# Patient Record
Sex: FEMALE | Race: WHITE | NOT HISPANIC OR LATINO | URBAN - METROPOLITAN AREA
[De-identification: names, ages, dates, MRNs, and addresses within clinical notes are randomized per-mention and may not be internally consistent; named-entity substitution may affect disease eponyms.]

---

## 2018-06-20 ENCOUNTER — INPATIENT (INPATIENT)
Facility: HOSPITAL | Age: 73
LOS: 0 days | Discharge: HOME | DRG: 291 | End: 2018-06-21
Attending: INTERNAL MEDICINE | Admitting: INTERNAL MEDICINE
Payer: COMMERCIAL

## 2018-06-20 VITALS
RESPIRATION RATE: 30 BRPM | HEART RATE: 99 BPM | DIASTOLIC BLOOD PRESSURE: 122 MMHG | OXYGEN SATURATION: 93 % | SYSTOLIC BLOOD PRESSURE: 180 MMHG | TEMPERATURE: 98 F

## 2018-06-20 DIAGNOSIS — E03.9 HYPOTHYROIDISM, UNSPECIFIED: ICD-10-CM

## 2018-06-20 DIAGNOSIS — N17.9 ACUTE KIDNEY FAILURE, UNSPECIFIED: ICD-10-CM

## 2018-06-20 DIAGNOSIS — J96.91 RESPIRATORY FAILURE, UNSPECIFIED WITH HYPOXIA: ICD-10-CM

## 2018-06-20 DIAGNOSIS — F32.9 MAJOR DEPRESSIVE DISORDER, SINGLE EPISODE, UNSPECIFIED: ICD-10-CM

## 2018-06-20 DIAGNOSIS — Z95.0 PRESENCE OF CARDIAC PACEMAKER: ICD-10-CM

## 2018-06-20 DIAGNOSIS — I50.20 UNSPECIFIED SYSTOLIC (CONGESTIVE) HEART FAILURE: ICD-10-CM

## 2018-06-20 DIAGNOSIS — J44.9 CHRONIC OBSTRUCTIVE PULMONARY DISEASE, UNSPECIFIED: ICD-10-CM

## 2018-06-20 DIAGNOSIS — I25.10 ATHEROSCLEROTIC HEART DISEASE OF NATIVE CORONARY ARTERY WITHOUT ANGINA PECTORIS: ICD-10-CM

## 2018-06-20 DIAGNOSIS — Z95.0 PRESENCE OF CARDIAC PACEMAKER: Chronic | ICD-10-CM

## 2018-06-20 DIAGNOSIS — I48.91 UNSPECIFIED ATRIAL FIBRILLATION: ICD-10-CM

## 2018-06-20 DIAGNOSIS — Z29.9 ENCOUNTER FOR PROPHYLACTIC MEASURES, UNSPECIFIED: ICD-10-CM

## 2018-06-20 DIAGNOSIS — R63.8 OTHER SYMPTOMS AND SIGNS CONCERNING FOOD AND FLUID INTAKE: ICD-10-CM

## 2018-06-20 DIAGNOSIS — I44.7 LEFT BUNDLE-BRANCH BLOCK, UNSPECIFIED: ICD-10-CM

## 2018-06-20 DIAGNOSIS — R06.02 SHORTNESS OF BREATH: ICD-10-CM

## 2018-06-20 DIAGNOSIS — I10 ESSENTIAL (PRIMARY) HYPERTENSION: ICD-10-CM

## 2018-06-20 LAB
ALBUMIN SERPL ELPH-MCNC: 3.5 G/DL — SIGNIFICANT CHANGE UP (ref 3.4–5)
ALP SERPL-CCNC: 86 U/L — SIGNIFICANT CHANGE UP (ref 40–120)
ALT FLD-CCNC: 35 U/L — SIGNIFICANT CHANGE UP (ref 12–42)
ANION GAP SERPL CALC-SCNC: 16 MMOL/L — SIGNIFICANT CHANGE UP (ref 5–17)
ANION GAP SERPL CALC-SCNC: 18 MMOL/L — HIGH (ref 9–16)
APPEARANCE UR: CLEAR — SIGNIFICANT CHANGE UP
APTT BLD: 73.1 SEC — HIGH (ref 27.5–36.5)
AST SERPL-CCNC: 46 U/L — HIGH (ref 15–37)
BASOPHILS NFR BLD AUTO: 0.9 % — SIGNIFICANT CHANGE UP (ref 0–2)
BILIRUB SERPL-MCNC: 0.2 MG/DL — SIGNIFICANT CHANGE UP (ref 0.2–1.2)
BILIRUB UR-MCNC: NEGATIVE — SIGNIFICANT CHANGE UP
BUN SERPL-MCNC: 23 MG/DL — SIGNIFICANT CHANGE UP (ref 7–23)
BUN SERPL-MCNC: 25 MG/DL — HIGH (ref 7–23)
CALCIUM SERPL-MCNC: 9 MG/DL — SIGNIFICANT CHANGE UP (ref 8.5–10.5)
CALCIUM SERPL-MCNC: 9.2 MG/DL — SIGNIFICANT CHANGE UP (ref 8.4–10.5)
CHLORIDE SERPL-SCNC: 103 MMOL/L — SIGNIFICANT CHANGE UP (ref 96–108)
CHLORIDE SERPL-SCNC: 104 MMOL/L — SIGNIFICANT CHANGE UP (ref 96–108)
CHOLEST SERPL-MCNC: 113 MG/DL — SIGNIFICANT CHANGE UP (ref 10–199)
CK MB BLD-MCNC: 0.94 % — SIGNIFICANT CHANGE UP
CK MB CFR SERPL CALC: 1.1 NG/ML — SIGNIFICANT CHANGE UP (ref 0.5–3.6)
CK MB CFR SERPL CALC: 3.4 NG/ML — SIGNIFICANT CHANGE UP (ref 0–6.7)
CK MB CFR SERPL CALC: 3.5 NG/ML — SIGNIFICANT CHANGE UP (ref 0–6.7)
CK SERPL-CCNC: 104 U/L — SIGNIFICANT CHANGE UP (ref 25–170)
CK SERPL-CCNC: 95 U/L — SIGNIFICANT CHANGE UP (ref 25–170)
CO2 SERPL-SCNC: 18 MMOL/L — LOW (ref 22–31)
CO2 SERPL-SCNC: 20 MMOL/L — LOW (ref 22–31)
COLOR SPEC: YELLOW — SIGNIFICANT CHANGE UP
CREAT ?TM UR-MCNC: 29 MG/DL — SIGNIFICANT CHANGE UP
CREAT SERPL-MCNC: 0.99 MG/DL — SIGNIFICANT CHANGE UP (ref 0.5–1.3)
CREAT SERPL-MCNC: 1.44 MG/DL — HIGH (ref 0.5–1.3)
DIFF PNL FLD: ABNORMAL
EOSINOPHIL NFR BLD AUTO: 4.8 % — SIGNIFICANT CHANGE UP (ref 0–6)
GLUCOSE SERPL-MCNC: 131 MG/DL — HIGH (ref 70–99)
GLUCOSE SERPL-MCNC: 268 MG/DL — HIGH (ref 70–99)
GLUCOSE UR QL: NEGATIVE — SIGNIFICANT CHANGE UP
HBA1C BLD-MCNC: 5.1 % — SIGNIFICANT CHANGE UP (ref 4–5.6)
HCT VFR BLD CALC: 39.2 % — SIGNIFICANT CHANGE UP (ref 34.5–45)
HDLC SERPL-MCNC: 45 MG/DL — SIGNIFICANT CHANGE UP (ref 40–125)
HGB BLD-MCNC: 11.8 G/DL — SIGNIFICANT CHANGE UP (ref 11.5–15.5)
IMM GRANULOCYTES NFR BLD AUTO: 0.3 % — SIGNIFICANT CHANGE UP (ref 0–1.5)
INR BLD: 1.3 — HIGH (ref 0.88–1.16)
KETONES UR-MCNC: NEGATIVE — SIGNIFICANT CHANGE UP
LEUKOCYTE ESTERASE UR-ACNC: NEGATIVE — SIGNIFICANT CHANGE UP
LIPID PNL WITH DIRECT LDL SERPL: 56 MG/DL — SIGNIFICANT CHANGE UP
LYMPHOCYTES # BLD AUTO: 29.7 % — SIGNIFICANT CHANGE UP (ref 13–44)
MCHC RBC-ENTMCNC: 28 PG — SIGNIFICANT CHANGE UP (ref 27–34)
MCHC RBC-ENTMCNC: 30.1 G/DL — LOW (ref 32–36)
MCV RBC AUTO: 92.9 FL — SIGNIFICANT CHANGE UP (ref 80–100)
MONOCYTES NFR BLD AUTO: 4.8 % — SIGNIFICANT CHANGE UP (ref 2–14)
NEUTROPHILS NFR BLD AUTO: 59.5 % — SIGNIFICANT CHANGE UP (ref 43–77)
NITRITE UR-MCNC: NEGATIVE — SIGNIFICANT CHANGE UP
NT-PROBNP SERPL-SCNC: 1893 PG/ML — HIGH
PCO2 BLDV: 64 MMHG — HIGH (ref 41–51)
PCO2 BLDV: 75 MMHG — HIGH (ref 41–51)
PH BLDV: 6.98 — CRITICAL LOW (ref 7.32–7.43)
PH BLDV: 7.1 — CRITICAL LOW (ref 7.32–7.43)
PH UR: 5 — SIGNIFICANT CHANGE UP (ref 5–8)
PLATELET # BLD AUTO: 324 K/UL — SIGNIFICANT CHANGE UP (ref 150–400)
PO2 BLDV: 20 MMHG — LOW (ref 35–40)
PO2 BLDV: 28 MMHG — LOW (ref 35–40)
POTASSIUM SERPL-MCNC: 4 MMOL/L — SIGNIFICANT CHANGE UP (ref 3.5–5.3)
POTASSIUM SERPL-MCNC: 4.4 MMOL/L — SIGNIFICANT CHANGE UP (ref 3.5–5.3)
POTASSIUM SERPL-SCNC: 4 MMOL/L — SIGNIFICANT CHANGE UP (ref 3.5–5.3)
POTASSIUM SERPL-SCNC: 4.4 MMOL/L — SIGNIFICANT CHANGE UP (ref 3.5–5.3)
PROT SERPL-MCNC: 7.3 G/DL — SIGNIFICANT CHANGE UP (ref 6.4–8.2)
PROT UR-MCNC: NEGATIVE MG/DL — SIGNIFICANT CHANGE UP
PROTHROM AB SERPL-ACNC: 14.4 SEC — HIGH (ref 9.8–12.7)
RBC # BLD: 4.22 M/UL — SIGNIFICANT CHANGE UP (ref 3.8–5.2)
RBC # FLD: 13.5 % — SIGNIFICANT CHANGE UP (ref 10.3–16.9)
SAO2 % BLDV: 14 % — SIGNIFICANT CHANGE UP
SAO2 % BLDV: 31 % — SIGNIFICANT CHANGE UP
SODIUM SERPL-SCNC: 139 MMOL/L — SIGNIFICANT CHANGE UP (ref 135–145)
SODIUM SERPL-SCNC: 140 MMOL/L — SIGNIFICANT CHANGE UP (ref 132–145)
SODIUM UR-SCNC: 85 MMOL/L — SIGNIFICANT CHANGE UP
SP GR SPEC: 1.01 — SIGNIFICANT CHANGE UP (ref 1–1.03)
TOTAL CHOLESTEROL/HDL RATIO MEASUREMENT: 2.5 RATIO — LOW (ref 3.3–7.1)
TRIGL SERPL-MCNC: 61 MG/DL — SIGNIFICANT CHANGE UP (ref 10–149)
TROPONIN I SERPL-MCNC: <0.017 NG/ML — LOW (ref 0.02–0.06)
TROPONIN T SERPL-MCNC: 0.03 NG/ML — HIGH (ref 0–0.01)
TROPONIN T SERPL-MCNC: 0.04 NG/ML — HIGH (ref 0–0.01)
TROPONIN T SERPL-MCNC: 0.05 NG/ML — CRITICAL HIGH (ref 0–0.01)
TSH SERPL-MCNC: 3.87 UIU/ML — SIGNIFICANT CHANGE UP (ref 0.35–4.94)
UROBILINOGEN FLD QL: 0.2 E.U./DL — SIGNIFICANT CHANGE UP
UUN UR-MCNC: 174 MG/DL — SIGNIFICANT CHANGE UP
WBC # BLD: 11.6 K/UL — HIGH (ref 3.8–10.5)
WBC # FLD AUTO: 11.6 K/UL — HIGH (ref 3.8–10.5)

## 2018-06-20 PROCEDURE — 93306 TTE W/DOPPLER COMPLETE: CPT | Mod: 26

## 2018-06-20 PROCEDURE — 93280 PM DEVICE PROGR EVAL DUAL: CPT | Mod: 26

## 2018-06-20 PROCEDURE — 99233 SBSQ HOSP IP/OBS HIGH 50: CPT

## 2018-06-20 PROCEDURE — 71045 X-RAY EXAM CHEST 1 VIEW: CPT | Mod: 26

## 2018-06-20 PROCEDURE — 99291 CRITICAL CARE FIRST HOUR: CPT

## 2018-06-20 PROCEDURE — 93010 ELECTROCARDIOGRAM REPORT: CPT

## 2018-06-20 RX ORDER — FUROSEMIDE 40 MG
80 TABLET ORAL ONCE
Qty: 0 | Refills: 0 | Status: COMPLETED | OUTPATIENT
Start: 2018-06-20 | End: 2018-06-20

## 2018-06-20 RX ORDER — ALBUTEROL 90 UG/1
2 AEROSOL, METERED ORAL
Qty: 0 | Refills: 0 | COMMUNITY

## 2018-06-20 RX ORDER — ASPIRIN/CALCIUM CARB/MAGNESIUM 324 MG
325 TABLET ORAL ONCE
Qty: 0 | Refills: 0 | Status: COMPLETED | OUTPATIENT
Start: 2018-06-20 | End: 2018-06-20

## 2018-06-20 RX ORDER — DABIGATRAN ETEXILATE MESYLATE 150 MG/1
150 CAPSULE ORAL EVERY 12 HOURS
Qty: 0 | Refills: 0 | Status: DISCONTINUED | OUTPATIENT
Start: 2018-06-20 | End: 2018-06-20

## 2018-06-20 RX ORDER — ATORVASTATIN CALCIUM 80 MG/1
80 TABLET, FILM COATED ORAL AT BEDTIME
Qty: 0 | Refills: 0 | Status: DISCONTINUED | OUTPATIENT
Start: 2018-06-20 | End: 2018-06-21

## 2018-06-20 RX ORDER — FUROSEMIDE 40 MG
20 TABLET ORAL DAILY
Qty: 0 | Refills: 0 | Status: DISCONTINUED | OUTPATIENT
Start: 2018-06-21 | End: 2018-06-21

## 2018-06-20 RX ORDER — SOTALOL HCL 120 MG
80 TABLET ORAL
Qty: 0 | Refills: 0 | Status: DISCONTINUED | OUTPATIENT
Start: 2018-06-20 | End: 2018-06-20

## 2018-06-20 RX ORDER — DABIGATRAN ETEXILATE MESYLATE 150 MG/1
1 CAPSULE ORAL
Qty: 0 | Refills: 0 | COMMUNITY

## 2018-06-20 RX ORDER — LISINOPRIL 2.5 MG/1
10 TABLET ORAL DAILY
Qty: 0 | Refills: 0 | Status: DISCONTINUED | OUTPATIENT
Start: 2018-06-20 | End: 2018-06-20

## 2018-06-20 RX ORDER — FUROSEMIDE 40 MG
40 TABLET ORAL ONCE
Qty: 0 | Refills: 0 | Status: COMPLETED | OUTPATIENT
Start: 2018-06-20 | End: 2018-06-20

## 2018-06-20 RX ORDER — FLUTICASONE PROPIONATE AND SALMETEROL 50; 250 UG/1; UG/1
1 POWDER ORAL; RESPIRATORY (INHALATION)
Qty: 0 | Refills: 0 | COMMUNITY

## 2018-06-20 RX ORDER — NITROGLYCERIN 6.5 MG
0.4 CAPSULE, EXTENDED RELEASE ORAL ONCE
Qty: 0 | Refills: 0 | Status: COMPLETED | OUTPATIENT
Start: 2018-06-20 | End: 2018-06-20

## 2018-06-20 RX ORDER — IPRATROPIUM/ALBUTEROL SULFATE 18-103MCG
3 AEROSOL WITH ADAPTER (GRAM) INHALATION EVERY 4 HOURS
Qty: 0 | Refills: 0 | Status: DISCONTINUED | OUTPATIENT
Start: 2018-06-20 | End: 2018-06-21

## 2018-06-20 RX ORDER — RAMIPRIL 5 MG
1 CAPSULE ORAL
Qty: 0 | Refills: 0 | COMMUNITY

## 2018-06-20 RX ORDER — METOPROLOL TARTRATE 50 MG
12.5 TABLET ORAL
Qty: 0 | Refills: 0 | Status: DISCONTINUED | OUTPATIENT
Start: 2018-06-20 | End: 2018-06-21

## 2018-06-20 RX ORDER — LEVOTHYROXINE SODIUM 125 MCG
1 TABLET ORAL
Qty: 0 | Refills: 0 | COMMUNITY

## 2018-06-20 RX ORDER — LEVOTHYROXINE SODIUM 125 MCG
75 TABLET ORAL DAILY
Qty: 0 | Refills: 0 | Status: DISCONTINUED | OUTPATIENT
Start: 2018-06-20 | End: 2018-06-21

## 2018-06-20 RX ORDER — DABIGATRAN ETEXILATE MESYLATE 150 MG/1
75 CAPSULE ORAL EVERY 12 HOURS
Qty: 0 | Refills: 0 | Status: DISCONTINUED | OUTPATIENT
Start: 2018-06-20 | End: 2018-06-21

## 2018-06-20 RX ORDER — BUDESONIDE AND FORMOTEROL FUMARATE DIHYDRATE 160; 4.5 UG/1; UG/1
2 AEROSOL RESPIRATORY (INHALATION)
Qty: 0 | Refills: 0 | Status: DISCONTINUED | OUTPATIENT
Start: 2018-06-20 | End: 2018-06-21

## 2018-06-20 RX ORDER — ZOLPIDEM TARTRATE 10 MG/1
1 TABLET ORAL
Qty: 0 | Refills: 0 | COMMUNITY

## 2018-06-20 RX ORDER — HEPARIN SODIUM 5000 [USP'U]/ML
5000 INJECTION INTRAVENOUS; SUBCUTANEOUS EVERY 8 HOURS
Qty: 0 | Refills: 0 | Status: DISCONTINUED | OUTPATIENT
Start: 2018-06-20 | End: 2018-06-20

## 2018-06-20 RX ORDER — ASPIRIN/CALCIUM CARB/MAGNESIUM 324 MG
81 TABLET ORAL DAILY
Qty: 0 | Refills: 0 | Status: DISCONTINUED | OUTPATIENT
Start: 2018-06-20 | End: 2018-06-21

## 2018-06-20 RX ORDER — MONTELUKAST 4 MG/1
1 TABLET, CHEWABLE ORAL
Qty: 0 | Refills: 0 | COMMUNITY

## 2018-06-20 RX ORDER — MONTELUKAST 4 MG/1
10 TABLET, CHEWABLE ORAL DAILY
Qty: 0 | Refills: 0 | Status: DISCONTINUED | OUTPATIENT
Start: 2018-06-20 | End: 2018-06-21

## 2018-06-20 RX ORDER — ASPIRIN/CALCIUM CARB/MAGNESIUM 324 MG
1 TABLET ORAL
Qty: 0 | Refills: 0 | COMMUNITY

## 2018-06-20 RX ORDER — BUPROPION HYDROCHLORIDE 150 MG/1
1 TABLET, EXTENDED RELEASE ORAL
Qty: 0 | Refills: 0 | COMMUNITY

## 2018-06-20 RX ADMIN — ATORVASTATIN CALCIUM 80 MILLIGRAM(S): 80 TABLET, FILM COATED ORAL at 21:51

## 2018-06-20 RX ADMIN — Medication 75 MICROGRAM(S): at 07:09

## 2018-06-20 RX ADMIN — Medication 80 MILLIGRAM(S): at 02:40

## 2018-06-20 RX ADMIN — DABIGATRAN ETEXILATE MESYLATE 75 MILLIGRAM(S): 150 CAPSULE ORAL at 21:51

## 2018-06-20 RX ADMIN — Medication 12.5 MILLIGRAM(S): at 18:10

## 2018-06-20 RX ADMIN — Medication 0.4 MILLIGRAM(S): at 02:40

## 2018-06-20 RX ADMIN — Medication 3 MILLILITER(S): at 06:30

## 2018-06-20 RX ADMIN — BUDESONIDE AND FORMOTEROL FUMARATE DIHYDRATE 2 PUFF(S): 160; 4.5 AEROSOL RESPIRATORY (INHALATION) at 18:11

## 2018-06-20 RX ADMIN — DABIGATRAN ETEXILATE MESYLATE 150 MILLIGRAM(S): 150 CAPSULE ORAL at 07:09

## 2018-06-20 RX ADMIN — Medication 80 MILLIGRAM(S): at 08:20

## 2018-06-20 RX ADMIN — Medication 40 MILLIGRAM(S): at 11:06

## 2018-06-20 RX ADMIN — Medication 325 MILLIGRAM(S): at 03:27

## 2018-06-20 RX ADMIN — Medication 0.4 MILLIGRAM(S): at 02:30

## 2018-06-20 RX ADMIN — Medication 81 MILLIGRAM(S): at 11:06

## 2018-06-20 RX ADMIN — MONTELUKAST 10 MILLIGRAM(S): 4 TABLET, CHEWABLE ORAL at 07:09

## 2018-06-20 NOTE — PROGRESS NOTE ADULT - PROBLEM SELECTOR PLAN 8
History of hypothyroidism.  - Continue with home synthroid 75mcg.   - Check TSH. History of depression. Currently asymptomatic, no SI/HI.  - Continue with home bupropion XL 300mg.

## 2018-06-20 NOTE — H&P ADULT - PROBLEM SELECTOR PROBLEM 2
LBBB (left bundle branch block) KANDY (acute kidney injury) HFrEF (heart failure with reduced ejection fraction)

## 2018-06-20 NOTE — H&P ADULT - PROBLEM SELECTOR PLAN 4
F: no IVF indicated at this time.  E: replete lytes prn.  N: NPO on BIPAP. DASH diet when appropriate. VTE ppx: no indication for pharmacoprophylaxis at this time given low risk for VTE (IMPROVE score of 1).    Code: FULL CODE. CAD s/p PCI x 2  asa 81mg qd  crestor 40mg qhs, Patient states she had a PPM placed back in 2014 after her heart rates went really slow and really fast. PPM interrogated one week prior as per patient.   - Plan as above.

## 2018-06-20 NOTE — H&P ADULT - ASSESSMENT
71 y/o F with PMHx of CHF, PPM, CKD3, HTN, breast CA in remission, compliant with meds, BIBEMS for acute onset SOB from sleep earlier tonight. Admitted from [facility] to cardiac telemetry. 73 y/o F with PMHx of CHF, PPM, CKD3, HTN, breast CA in remission, compliant with meds, BIBEMS for acute onset SOB from sleep earlier tonight. Admitted from [facility] to cardiac telemetry. 71 y/o F with PMHx of HFrEF (last TTE 1 year ago, reportedly EF 25%), atrial fibrillation (on Pradaxa) s/p PPM, CAD s/p PCI x 2 (unk vessels, [facility] in 2010), COPD, HTN, hypothyroidism, depression, breast cancer in remission s/p bilateral mastectomy (1992), BIBEMS for acute onset SOB earlier tonight in the setting of not taking her diuretics for the past two days. Sent from [facility] for management of hypoxic respiratory failure likely 2/2 HF exacerbation vs. r/o ACS vs. COPD.

## 2018-06-20 NOTE — PHYSICAL THERAPY INITIAL EVALUATION ADULT - PERTINENT HX OF CURRENT PROBLEM, REHAB EVAL
72 year old female  BIBEMS for acute onset SOB earlier tonight in the setting of not taking her diuretics for the past two days. States that she was in her usual state of health when she went to bed. Awoke from sleep, walked down the stairs to go get some water, when she tripped and fell near the bottom of the stairs.

## 2018-06-20 NOTE — H&P ADULT - PROBLEM SELECTOR PLAN 6
History of HTN. BP initially 180 systolic on presentation.  - Continue with ramipril 2.5mg qd. Initially labs with BUN/Cr 23/1.44. Unknown baseline. Differential includes KANDY vs. CKD.  - Check urine lytes to elucidate etiology of elevated Cr. Checking FEUrea in the setting of lasix use.  - Obtain collateral from PMD.

## 2018-06-20 NOTE — PROGRESS NOTE ADULT - PROBLEM SELECTOR PLAN 1
Etiology likely secondary to pulm edema in setting to medication noncompliance( Pt not taking her Lasix for 2 days as she was travelling and did not want to pass urine )  Of note pt also had severe hypertension(secondary to pain in setting of fall ), that with med non compliance likel Etiology likely secondary to pulm edema in setting to medication noncompliance( Pt not taking her Lasix for 2 days as she was travelling and did not want to pass urine )  Of note pt also had severe hypertension(secondary to pain in setting of fall )  s/p iv diuresis 80 mg , pt clinically improved , speaking in full sentences now.   will give oral 40 mg lasik, (home dose 20 ) and transition back to 20 upon discharge  Pt now weaned of bipap, now satting well on RA  strict I& O  DAILY WEIGHTS

## 2018-06-20 NOTE — ED PROVIDER NOTE - MEDICAL DECISION MAKING DETAILS
CHF exacerbation with significant hypoxia and respiratory distress, improving with bipap lasix and SLNTG, cxr c/w CHF, LBBB on EKG, no signs of acute ischemia, awaiting labs, will need admission for continued NIPPV, diuretic therapy, TTE, serial cardiac enzymes, further workup and treatment as indicated

## 2018-06-20 NOTE — H&P ADULT - PROBLEM SELECTOR PLAN 5
VTE ppx: no indication for pharmacoprophylaxis at this time given low risk for VTE (IMPROVE score of 1).    Code: FULL CODE. Initially labs with BUN/Cr 23/1.44. Unknown baseline. Differential includes KANDY vs. CKD.  - Check urine lytes to elucidate etiology of elevated Cr. Checking FEUrea in the setting of lasix use.  - Obtain collateral from PMD. History of CAD s/p PCI x 2, unknown vessels. Presented with acute onset SOB. Trop neg x 1 at [facility]. On asa 81mg qd and crestor 40mg qhs at home. ASA loaded at [facility].  - Continue on ASA 81mg qd and atorvastatin 80mg qhs as per therapeutic interchange.  - TTE in AM.  - EKG in AM. History of CAD s/p PCI x 2, unknown vessels. Presented with acute onset SOB. Trop neg x 1 at [facility]. On asa 81mg qd and crestor 40mg qhs at home. ASA loaded at [facility].  - Continue on ASA 81mg qd and atorvastatin 80mg qhs as per therapeutic interchange.  - TTE in AM.  - EKG in AM.  - Obtain collateral from PMD. History of CAD s/p PCI x 2, unknown vessels. Placed at [facility] in 2010. Presented with acute onset SOB. Trop neg x 1 at [facility]. On asa 81mg qd and crestor 40mg qhs at home. ASA loaded at [facility].  - Continue on ASA 81mg qd and atorvastatin 80mg qhs as per therapeutic interchange.  - TTE in AM.  - EKG in AM.  - Obtain collateral from PMD.

## 2018-06-20 NOTE — PROGRESS NOTE ADULT - ATTENDING COMMENTS
Pt is a 73 yo F with h/o CAD s/p SHELLIE to RCA, mLAD in 2010, PAF c/b tachy-leila syndrome s/p dual chamber ppm, CM p/w ADHF in setting of medication non-compliance of diuretic and hypertensive emergency from mechanical fall. VS reviewed and labs notable for mildly elevated troponin. TTE notable for LVEF 20-25% w/ no hemodynamically significant valve disease.   - Collateral information obtained and it appears in 2016 her EF was 50-55%, with steadily declining EF most recently 6/2/18 of 35-40% with her cardiologist in [place]. It should be noted that her EF at one point in time was as low as 10-15% per report with full recovery. With that, she has e/o a LBBB which her cardiologist in [place] reports does not appear new.   Plan:  - In light of dropping EF, she requires further ischemic eval and would like to proceed with [facility] at this time. Pt hesitant to perform this today at [facility] and for this will discuss with her the need. In light of EF <35%, will proceed with initiation of lopressor 12.5 mg twice daily tonight. Cont to hold acei while awaiting morning BMP. Can resume Lasix 20 mg po daily for tomorrow.   - Have asked EP to weigh in on the use of sotalol at this time given her severe systolic dysfunction.

## 2018-06-20 NOTE — PROGRESS NOTE ADULT - PROBLEM SELECTOR PLAN 10
F: no IVF indicated at this time.  E: replete lytes prn.  N: NPO on BIPAP. DASH diet when appropriate.    Ppx: on Pradaxa.  Code: FULL CODE.

## 2018-06-20 NOTE — PROGRESS NOTE ADULT - PROBLEM SELECTOR PLAN 5
History of CAD s/p PCI x 2, unknown vessels. Placed at [facility] in 2010. Presented with acute onset SOB. Trop neg x 1 at [facility]. On asa 81mg qd and crestor 40mg qhs at home. ASA loaded at [facility].  - Continue on ASA 81mg qd and atorvastatin 80mg qhs as per therapeutic interchange.  - TTE in AM.  - EKG in AM.  - Obtain collateral from PMD. Initially labs with BUN/Cr 23/1.44. Unknown baseline. Differential includes KANDY vs. CKD.  - Check urine lytes to elucidate etiology of elevated Cr. Checking FEUrea in the setting of lasix use.  - Obtain collateral from PMD.

## 2018-06-20 NOTE — PROGRESS NOTE ADULT - PROBLEM SELECTOR PLAN 7
History of COPD. Not currently in exacerbation, but initially with mild end expiratory wheezes secondary to medication non-compliance.  - Duonebs q4h prn for wheezing/SOB.  - Continue with home doses of montelukast 10mg qd and advair 250/50.  - Supplemental O2 as needed. History of hypothyroidism.  - Continue with home synthroid 75mcg.   - Check TSH.

## 2018-06-20 NOTE — ED ADULT NURSE NOTE - OBJECTIVE STATEMENT
72y female, BIBA for sudden onset SOB at home. Pt came in via EMS with CPAP with O2 sat at the 90s. Pt also noted hypertensive, 180/122 BP; switched to BiPAP 15/10 settings. Pt placed on cardiac monitor. Given 2 Nitro Sublingual, 5mins in between. with improvement of sx. Lasix given 80mg IVP.Victor cath inserted with initial output of 650ml.

## 2018-06-20 NOTE — H&P ADULT - NSHPOUTPATIENTPROVIDERS_GEN_ALL_CORE
PMD Dr. Sterling ([place])  Dr. Adi Yost (cardiologist in [facility]) PMD Dr. Nuha Sterling (PMD in [facility]; [phone])  Dr. Adi Yost (cardiologist in [facility] - maybe [phone] / [phone])

## 2018-06-20 NOTE — H&P ADULT - PROBLEM SELECTOR PLAN 3
Initially labs with BUN/Cr 23/1.44. F: no IVF indicated at this time.  E: replete lytes prn.  N: NPO on BIPAP. DASH diet when appropriate. Atrial fibrillation  pradaxa 150mg bid  betapace 80mg bid History of possible atrial fibrillation. Patient states she had a PPM placed back in 2014 after her heart rates went really slow and really fast. PPM interrogated one week prior as per patient. Has been on pradaxa 150mg bid and sotalol 80mg bid ever since.  - Continue on pradaxa 150mg bid and sotalol 80mg bid. History of possible atrial fibrillation. Patient states she had a PPM placed back in 2014 after her heart rates went really slow and really fast. PPM interrogated one week prior as per patient. Has been on pradaxa 150mg bid and sotalol 80mg bid ever since.  - Continue on pradaxa 150mg bid and sotalol 80mg bid.  - Consult EP for PPM interrogation.  - Obtain collateral from PMD.

## 2018-06-20 NOTE — PROGRESS NOTE ADULT - PROBLEM SELECTOR PLAN 3
History of possible atrial fibrillation. Patient states she had a PPM placed back in 2014 after her heart rates went really slow and really fast. PPM interrogated one week prior as per patient. Has been on pradaxa 150mg bid and sotalol 80mg bid ever since.  - Continue on pradaxa 150mg bid and sotalol 80mg bid.  - Consult EP for PPM interrogation.  - Obtain collateral from PMD. As per collateral Pt was first seen 3 years ago by Dr Yost, she was found to be bradycardic, holter showed a fib with long pauses, pt was put on dual pacemaker since.   Device was interrogated a week ago in [place], as per PMD   Has been on pradaxa 150mg bid and sotalol 80mg bid ever since  .- Continue on pradaxa 150mg bid and sotalol 80mg bid.  EP saw pt and interrogated device, normal device check.  She should follow up with her primary EP doctor as scheduled.  No parameter changes.  paroxysmal afib with a low burden <1% on pradaxa- there were no arrhythmias that correlate with her hospital stay / SOB

## 2018-06-20 NOTE — PROGRESS NOTE ADULT - PROBLEM SELECTOR PLAN 4
Patient states she had a PPM placed back in 2014 after her heart rates went really slow and really fast. PPM interrogated one week prior as per patient.   - Plan as above. History of CAD s/p PCI x 2, unknown vessels done in [facility] in 2010. Presented with acute onset SOB. Trop neg x 1 at [facility]. On asa 81mg qd and crestor 40mg qhs at home. ASA loaded at [facility].  - Continue on ASA 81mg qd and atorvastatin 80mg qhs as per therapeutic interchange.

## 2018-06-20 NOTE — H&P ADULT - PROBLEM SELECTOR PLAN 1
Presented with acute onset SOB tonight. Differential includes HF exacerbation vs.  - Trend cardiac enzymes.  - Continue with BIPAP.  - Lasix IV prn.   - Strict I&Os.  - Daily weights. Presented with acute onset SOB. CXR with pulmonary vascular congestion pattern. BNP 1893. GIven history of HFrEF, HF exacerbation is the most likely cause. Ruling out ACS given history of CAD s/p PCI x 2. Also suspect component of COPD in initial hypoxic respiratory failure. Less likely PE given Wells Score of 1.5.   - Trend cardiac enzymes.  - Wean off NC O2 as tolerated. BIPAP at bedside.  - Can give lasix IV prn, titrate to UOP. Home dose is lasix 20mg qd.   - Continue ramipril 2.5mg qd home dose.  - Strict I&Os.  - Daily weights. Presented with acute onset SOB. CXR with pulmonary vascular congestion pattern. BNP 1893. GIven history of HFrEF, HF exacerbation is the most likely cause. Ruling out ACS given history of CAD s/p PCI x 2. Also suspect component of COPD in initial hypoxic respiratory failure. Less likely PE given Wells Score of 1.5.   - Trend cardiac enzymes.  - Wean off NC O2 as tolerated. BIPAP at bedside.  - Can give lasix IV prn, titrate to UOP. Home dose is lasix 20mg qd.   - Continue ramipril 2.5mg qd home dose.  - TTE in AM.  - EKG in AM.  - Strict I&Os.  - Daily weights. Presented with acute onset SOB. CXR with pulmonary vascular congestion pattern. BNP 1893. GIven history of HFrEF, HF exacerbation is the most likely cause. Ruling out ACS given history of CAD s/p PCI x 2. Also suspect component of COPD in initial hypoxic respiratory failure. Less likely PE given Wells Score of 1.5.   - Trend cardiac enzymes.  - Wean off NC O2 as tolerated. BIPAP at bedside.  - Can give lasix IV prn, titrate to UOP. Home dose is lasix 20mg qd.   - Continue ramipril 2.5mg qd home dose.  - TTE in AM.  - EKG in AM.  - Strict I&Os.  - Daily weights.  - Obtain collateral from PMD. Presented with acute onset SOB. CXR with pulmonary vascular congestion pattern. BNP 1893. VBG concerning 6.98, CO2 75, O2 20. Given history of HFrEF, HF exacerbation is the most likely cause. Ruling out ACS given history of CAD s/p PCI x 2 and EKG with LBBB (unknown if new). Also suspect component of COPD in initial hypoxic respiratory failure. Less likely PE given Wells Score of 1.5.   - Trend cardiac enzymes.  - Wean off NC O2 as tolerated. BIPAP at bedside.  - Can give lasix IV prn, titrate to UOP. Home dose is lasix 20mg qd.   - Continue ramipril 2.5mg qd home dose.  - TTE in AM.  - EKG in AM.  - Strict I&Os.  - Daily weights.  - Obtain collateral from PMD.

## 2018-06-20 NOTE — PROGRESS NOTE ADULT - SUBJECTIVE AND OBJECTIVE BOX
Electrophysiology Device Interrogation       Indication: CHF    Device model: Tax [employer]i Adaptaplaced  3/2015				                                 Functioning Mode: AAIR<-->DDDR 60/120		    Underlying Rhythm:  NSR     Pacemaker dependency:  NO AP 92%,  <0.1%    Battery status: 90years    Interrogating parameters:   				RA			RV			   Sense:                                      4-5.6                             8-11.2  Threshold:                              0.75V@0.4ms                    0.5V@0.4ms                                                                   Pacing Impedance:                 395                                 630                                                                                                                                                                           Events/Alert:  one episode of afib 4/2018 - she is on pradaxa    Parameter change: 	     Assessment: normal device check.  She should follow up with her primary EP doctor as scheduled.  No parameter changes.  paroxysmal afib with a low burden <1% on pradaxa- there were no arrhythmias that correlate with her hospital stay / SOB Electrophysiology Device Interrogation       Indication: CHF    Device model: [employer] Adaptaplaced  3/2015				                                 Functioning Mode: AAIR<-->DDDR 60/120		    Underlying Rhythm:  NSR     Pacemaker dependency:  NO AP 92%,  <0.1%    Battery status: 90years    Interrogating parameters:   				RA			RV			   Sense:                                      4-5.6                             8-11.2  Threshold:                              0.75V@0.4ms                    0.5V@0.4ms                                                                   Pacing Impedance:                 395                                 630                                                                                                                                                                           Events/Alert:  one episode of afib 4/2018 - she is on pradaxa    Parameter change: 	     Assessment: normal device check.  She should follow up with her primary EP doctor as scheduled.  No parameter changes.  paroxysmal afib with a low burden <1% on pradaxa- there were no arrhythmias that correlate with her hospital stay / SOB  patient is on Sotalol and has regular cardiology / EP follow up - this is suppressing her atrial fibrillation (<0.5% burden).  Any changes in this should be made by her primary doctor.  If she needs to be on a beta blocker from a general cardiology perspective for a decreased EF there is no electrophysiology contraindication to starting Metoprolol while on Sotalol. Electrophysiology Device Interrogation       Indication: CHF    Device model: [employer] Adaptaplaced  3/2015				                                 Functioning Mode: AAIR<-->DDDR 60/120		    Underlying Rhythm:  NSR     Pacemaker dependency:  NO AP 92%,  <0.1%    Battery status: 90years    Interrogating parameters:   				RA			RV			   Sense:                                      4-5.6                             8-11.2  Threshold:                              0.75V@0.4ms                    0.5V@0.4ms                                                                   Pacing Impedance:                 395                                 630                                                                                                                                                                           Events/Alert:  one episode of afib 4/2018 - she is on pradaxa    Parameter change: 	     Assessment: normal device check.  She should follow up with her primary EP doctor as scheduled.  No parameter changes.  paroxysmal afib with a low burden <1% on pradaxa- there were no arrhythmias that correlate with her hospital stay / SOB  patient is on Sotalol and has regular cardiology / EP follow up -sotalol appears to be successful at suppressing her atrial fibrillation (<0.5% burden).  Any changes in this should be made by her primary doctor. There are no EP contraindications to the addition of metoprolol for treatment of a depressed EF as per the general published guidelines for treatment of a reduced ejection fraction.

## 2018-06-20 NOTE — H&P ADULT - NSHPLABSRESULTS_GEN_ALL_CORE
.  LABS:                         11.8   11.6  )-----------( 324      ( 20 Jun 2018 02:50 )             39.2     06-20    140  |  104  |  23  ----------------------------<  268<H>  4.4   |  18<L>  |  1.44<H>    Ca    9.0      20 Jun 2018 02:50    TPro  7.3  /  Alb  3.5  /  TBili  0.2  /  DBili  x   /  AST  46<H>  /  ALT  35  /  AlkPhos  86  06-20    PT/INR - ( 20 Jun 2018 02:50 )   PT: 14.4 sec;   INR: 1.30          PTT - ( 20 Jun 2018 02:50 )  PTT:73.1 sec    CARDIAC MARKERS ( 20 Jun 2018 02:50 )  <0.017 ng/mL / x     / 117 U/L / x     / 1.1 ng/mL      Serum Pro-Brain Natriuretic Peptide: 1893 pg/mL (06-20 @ 02:50)        RADIOLOGY, EKG & ADDITIONAL TESTS: Reviewed. .  LABS:                         11.8   11.6  )-----------( 324      ( 20 Jun 2018 02:50 )             39.2     06-20    140  |  104  |  23  ----------------------------<  268<H>  4.4   |  18<L>  |  1.44<H>    Ca    9.0      20 Jun 2018 02:50    TPro  7.3  /  Alb  3.5  /  TBili  0.2  /  DBili  x   /  AST  46<H>  /  ALT  35  /  AlkPhos  86  06-20    PT/INR - ( 20 Jun 2018 02:50 )   PT: 14.4 sec;   INR: 1.30     PTT - ( 20 Jun 2018 02:50 )  PTT:73.1 sec    CARDIAC MARKERS ( 20 Jun 2018 02:50 )  <0.017 ng/mL / x     / 117 U/L / x     / 1.1 ng/mL    Serum Pro-Brain Natriuretic Peptide: 1893 pg/mL (06-20 @ 02:50)    RADIOLOGY, EKG & ADDITIONAL TESTS: Reviewed.

## 2018-06-20 NOTE — H&P ADULT - PROBLEM SELECTOR PROBLEM 5
Need for prophylactic measure HTN (hypertension) KANDY (acute kidney injury) CAD (coronary artery disease)

## 2018-06-20 NOTE — ED PROVIDER NOTE - DIAGNOSTIC INTERPRETATION
cxr: Bipap tubing over mediastinum, Cardiac silhouette, and hilar contours wnl, no acute consolidation, +pulmonary edema. No bony abnormalities noted

## 2018-06-20 NOTE — H&P ADULT - HISTORY OF PRESENT ILLNESS
73 y/o F with PMHx of CHF, PPM, CKD3, HTN, breast CA in remission, compliant with meds, BIBEMS for acute onset SOB from sleep earlier tonight.    Patient called EMS and was found to have labored breathing, hypertensive and satting 70% upon arrival. Placed on CPAP with improvement to 90s. At [facility] ED, VS T 98, HR 99, /122, RR 30, O2 93 on CPAP. Patient was unable to speak more than 1 word, converted to bipap 15/10 with improvement in sats to 100. Labs notable for WBC 11.6, AG 18, BUN/Cr 23/1.44, glucose 268, coags elevated (PTT 73.1, INR 1.30), trop neg x 1, BNP 1893. CXR with pulmonary vascular congestion. EKG with LBBB. Given lasix 80mg IVP x 1, NTG SL x 2, ASA 325mg. Continued on BIPAP. 73 y/o F with PMHx of HFrEF (last TTE over 1 year ago reportedly EF 25%), ?tachy/leila s/p PPM, HTN, breast cancer in remission s/p bilateral mastectomy (1992), BIBEMS for acute onset SOB earlier tonight in the setting of not taking her diuretics for the past two days. States that she was in her usual state of health when she went to bed. Awoke from sleep, walked down the stairs to go get some water, when she tripped and fell near the bottom of the stairs. Denies head trauma or LOC. She then felt very anxious and acutely SOB. She went back upstairs and the SOB acutely worsened, feeling like she could not take a deep breath. Denies CP, palpitations, cough, orthopnea, PND, decreased exercise tolerance, URI symptoms, fever, chills, N/V/D, abd pain, LE edema. Has been trying to live a healthy lifestyle -- eating healthy and exercising every day.     Patient called EMS and was found to have labored breathing, hypertensive and satting 70% upon arrival. Placed on CPAP with improvement to 90s. At [facility] ED, VS T 98, HR 99, /122, RR 30, O2 93 on CPAP. Patient was unable to speak more than 1 word, converted to bipap 15/10 with improvement in sats to 100. Labs notable for WBC 11.6, AG 18, BUN/Cr 23/1.44, glucose 268, coags elevated (PTT 73.1, INR 1.30), trop neg x 1, BNP 1893. CXR with pulmonary vascular congestion. EKG with LBBB. Given lasix 80mg IVP x 1, NTG SL x 2, ASA 325mg. Continued on BIPAP. 73 y/o F with PMHx of HFrEF (last TTE over 1 year ago reportedly EF 25%), ?tachy/leila s/p PPM, COPD, HTN, hypothyroidism, depression, breast cancer in remission s/p bilateral mastectomy (), BIBEMS for acute onset SOB earlier tonight in the setting of not taking her diuretics for the past two days. States that she was in her usual state of health when she went to bed. Awoke from sleep, walked down the stairs to go get some water, when she tripped and fell near the bottom of the stairs. Denies head trauma or LOC. She then felt very anxious and acutely SOB. She went back upstairs and the SOB acutely worsened, feeling like she could not take a deep breath. Denies CP, palpitations, cough, orthopnea, PND, decreased exercise tolerance, URI symptoms, fever, chills, N/V/D, abd pain, LE edema. Has been trying to live a healthy lifestyle -- eating healthy and exercising every day.     Patient called EMS and was found to have labored breathing, hypertensive and satting 70% upon arrival. Placed on CPAP with improvement to 90s. At [facility] ED, VS T 98, HR 99, /122, RR 30, O2 93 on CPAP. Patient was unable to speak more than 1 word, converted to bipap 15/10 with improvement in sats to 100. Labs notable for WBC 11.6, AG 18, BUN/Cr 23/1.44, glucose 268, coags elevated (PTT 73.1, INR 1.30), trop neg x 1, BNP 1893. CXR with pulmonary vascular congestion. EKG with LBBB. Given lasix 80mg IVP x 1, NTG SL x 2, ASA 325mg. Continued on BIPAP.     PMHx: as above.  Surg hx: as above.  Meds: ramapril 2.5mg qd, lasix 20mg qd, montelukast 10mg qd, synthroid 75mcg qd, bupropion 300mg XL qd, asa 81mg qd, pradaxa 150mg bid, betapace 80mg bid, crestor 40mg qhs, ambien 20mg qhs, advair 250/50, ventolin hfa  All: NKA  Soc hx: former smoker quit 5 years ago, smoked for 25 y x 1.5ppd, former etoh, denies drugs  Fhx: mother and father with CAD,  before age of [age] y/o F with PMHx of HFrEF (last TTE 1 year ago, reportedly EF 25%), ?atrial fibrillation (on Pradaxa) s/p PPM, COPD, HTN, hypothyroidism, depression, breast cancer in remission s/p bilateral mastectomy (), BIBEMS for acute onset SOB earlier tonight in the setting of not taking her diuretics for the past two days. States that she was in her usual state of health when she went to bed. Awoke from sleep, walked down the stairs to go get some water, when she tripped and fell near the bottom of the stairs. Denies head trauma or LOC. She then felt very anxious and acutely SOB. She went back upstairs and the SOB acutely worsened, feeling like she could not take a deep breath. Denies CP, palpitations, cough, orthopnea, PND, decreased exercise tolerance, URI symptoms, fever, chills, N/V/D, abd pain, LE edema. Has been trying to live a healthy lifestyle -- eating healthy and exercising every day.     Patient called EMS and was found to have labored breathing, hypertensive and satting 70% upon arrival. Placed on CPAP with improvement to 90s. At [facility] ED, VS T 98, HR 99, /122, RR 30, O2 93 on CPAP. Patient was unable to speak more than 1 word, converted to bipap 15/10 with improvement in sats to 100. Labs notable for WBC 11.6, AG 18, BUN/Cr 23/1.44, glucose 268, coags elevated (PTT 73.1, INR 1.30), trop neg x 1, BNP 1893. CXR with pulmonary vascular congestion. EKG with LBBB. Given lasix 80mg IVP x 1, NTG SL x 2, ASA 325mg. Continued on BIPAP.     Upon arrival to [facility] [address], patient with no complaints. BIPAP was removed and patient tolerated 4L NC, satting 97%, respirations non-labored. UOP -600cc post-Lasix.    PMHx: as above.  Surg hx: as above.  Meds: ramapril 2.5mg qd, lasix 20mg qd, montelukast 10mg qd, synthroid 75mcg qd, bupropion 300mg XL qd, asa 81mg qd, pradaxa 150mg bid, betapace 80mg bid, crestor 40mg qhs, ambien 20mg qhs, advair 250/50, ventolin hfa  All: NKA  Soc hx: former smoker quit 5 years ago, smoked for 25 y x 1.5ppd, former etoh, denies drugs  Fhx: mother and father with CAD,  before age of [age] y/o F with PMHx of HFrEF (last TTE 1 year ago, reportedly EF 25%), ?atrial fibrillation (on Pradaxa) s/p PPM, COPD, HTN, hypothyroidism, depression, breast cancer in remission s/p bilateral mastectomy (), BIBEMS for acute onset SOB earlier tonight in the setting of not taking her diuretics for the past two days. States that she was in her usual state of health when she went to bed. Awoke from sleep, walked down the stairs to go get some water, when she tripped and fell near the bottom of the stairs. Denies head trauma or LOC. She then felt very anxious and acutely SOB. She went back upstairs and the SOB acutely worsened, feeling like she could not take a deep breath. Denies CP, palpitations, cough, orthopnea, PND, decreased exercise tolerance, URI symptoms, fever, chills, N/V/D, abd pain, LE edema. Has been trying to live a healthy lifestyle -- eating healthy and exercising every day.     Patient called EMS and was found to have labored breathing, hypertensive and satting 70% upon arrival. Placed on CPAP with improvement to 90s. At [facility] ED, VS T 98, HR 99, /122, RR 30, O2 93 on CPAP. Patient was unable to speak more than 1 word, converted to bipap 15/10 with improvement in sats to 100. Labs notable for WBC 11.6, AG 18, BUN/Cr 23/1.44, glucose 268, coags elevated (PTT 73.1, INR 1.30), trop neg x 1, BNP 1893. CXR with pulmonary vascular congestion. EKG with LBBB. Given lasix 80mg IVP x 1, NTG SL x 2, ASA 325mg. Continued on BIPAP.     Upon arrival to [facility] [address], BIPAP was removed and VS HR 85, /70, RR 18, O2 99 on 4L. Patient with no complaints, respirations non-labored. UOP -600cc post-Lasix.    PMHx: as above.  Surg hx: as above.  Meds: ramapril 2.5mg qd, lasix 20mg qd, montelukast 10mg qd, synthroid 75mcg qd, bupropion 300mg XL qd, asa 81mg qd, pradaxa 150mg bid, betapace 80mg bid, crestor 40mg qhs, ambien 20mg qhs, advair 250/50, ventolin hfa  All: NKA  Soc hx: former smoker quit 5 years ago, smoked for 25 y x 1.5ppd, former etoh, denies drugs  Fhx: mother and father with CAD,  before age of [age] y/o F with PMHx of HFrEF (last TTE 1 year ago, reportedly EF 25%), ?atrial fibrillation (on Pradaxa) s/p PPM, COPD, HTN, hypothyroidism, depression, breast cancer in remission s/p bilateral mastectomy (), BIBEMS for acute onset SOB earlier tonight in the setting of not taking her diuretics for the past two days. States that she was in her usual state of health when she went to bed. Awoke from sleep, walked down the stairs to go get some water, when she tripped and fell near the bottom of the stairs. Denies head trauma or LOC. She then felt very anxious and acutely SOB. She went back upstairs and the SOB acutely worsened, feeling like she could not take a deep breath. Denies CP, palpitations, cough, orthopnea, PND, decreased exercise tolerance, URI symptoms, fever, chills, N/V/D, abd pain, LE edema. Has been trying to live a healthy lifestyle -- eating healthy and exercising every day.     Patient called EMS and was found to have labored breathing, hypertensive and satting 70% upon arrival. Placed on CPAP with improvement to 90s. At [facility] ED, VS T 98, HR 99, /122, RR 30, O2 93 on CPAP. Patient was unable to speak more than 1 word, converted to bipap 15/10 with improvement in sats to 100. Labs notable for WBC 11.6, AG 18, BUN/Cr 23/1.44, glucose 268, coags elevated (PTT 73.1, INR 1.30), trop neg x 1, BNP 1893. CXR with pulmonary vascular congestion. EKG NSR 94 with LBBB. Given lasix 80mg IVP x 1, NTG SL x 2, ASA 325mg. Continued on BIPAP.     Upon arrival to [facility] [address], BIPAP was removed and VS HR 85, /70, RR 18, O2 99 on 4L. Patient with no complaints, respirations non-labored. UOP -600cc post-Lasix.    PMHx: as above.  Surg hx: as above.  Meds: ramapril 2.5mg qd, lasix 20mg qd, montelukast 10mg qd, synthroid 75mcg qd, bupropion 300mg XL qd, asa 81mg qd, pradaxa 150mg bid, betapace 80mg bid, crestor 40mg qhs, ambien 20mg qhs, advair 250/50, ventolin hfa  All: NKA  Soc hx: former smoker quit 5 years ago, smoked for 25 y x 1.5ppd, former etoh, denies drugs  Fhx: mother and father with CAD,  before age of [age] y/o F with PMHx of HFrEF (last TTE 1 year ago, reportedly EF 25%), atrial fibrillation (on Pradaxa) s/p PPM, CAD s/p PCI x 2 (unk vessels, NYP in ), COPD, HTN, hypothyroidism, depression, breast cancer in remission s/p bilateral mastectomy (), BIBEMS for acute onset SOB earlier tonight in the setting of not taking her diuretics for the past two days. States that she was in her usual state of health when she went to bed. Awoke from sleep, walked down the stairs to go get some water, when she tripped and fell near the bottom of the stairs. Denies head trauma or LOC. She then felt very anxious and acutely SOB. She went back upstairs and the SOB acutely worsened, feeling like she could not take a deep breath. Denies CP, palpitations, cough, orthopnea, PND, decreased exercise tolerance, URI symptoms, fever, chills, N/V/D, abd pain, LE edema. Has been trying to live a healthy lifestyle -- eating healthy and exercising every day.     Patient called EMS and was found to have labored breathing, hypertensive and satting 70% upon arrival. Placed on CPAP with improvement to 90s. At [facility] ED, VS T 98, HR 99, /122, RR 30, O2 93 on CPAP. Patient was unable to speak more than 1 word, converted to bipap 15/10 with improvement in sats to 100. Labs notable for WBC 11.6, AG 18, BUN/Cr 23/1.44, glucose 268, coags elevated (PTT 73.1, INR 1.30), trop neg x 1, BNP 1893. CXR with pulmonary vascular congestion. EKG NSR 94 with LBBB. Given lasix 80mg IVP x 1, NTG SL x 2, ASA 325mg. Continued on BIPAP.     Upon arrival to [facility] [address], BIPAP was removed and VS HR 85, /70, RR 18, O2 99 on 4L. Patient with no complaints, respirations non-labored. UOP -600cc post-Lasix.    PMHx: as above.  Surg hx: as above.  Meds: ramapril 2.5mg qd, lasix 20mg qd, montelukast 10mg qd, synthroid 75mcg qd, bupropion 300mg XL qd, asa 81mg qd, pradaxa 150mg bid, betapace 80mg bid, crestor 40mg qhs, ambien 20mg qhs, advair 250/50, ventolin hfa  All: NKA  Soc hx: former smoker quit 5 years ago, smoked for 25 y x 1.5ppd, former etoh, denies drugs  Fhx: mother and father with CAD,  before age of 69

## 2018-06-20 NOTE — ED PROVIDER NOTE - PROGRESS NOTE DETAILS
pt improving, BP 130s/90s, unlabored ph improving pt clinically improving discussed with Dr Villegas accepted to step down for further care

## 2018-06-20 NOTE — ED PROVIDER NOTE - OBJECTIVE STATEMENT
h/o CHF, PPM, HTN, breast CA in remission, compliant with meds, pt woke up with sudden onset shortness of breath tonight, called EMS, per EMS pt sattign 70% upon arrival, labored, hypertensive, placed on CPAP with improvement to 90s, brought to ED, here pt with increased WOB, unable to speak more than 1 word, hypertensive, denies f/c/cp/abd pain/leg swelling, converted to bipap 15/10 with improvement in sats to 100, 2 SLNTG given over 5 minutes with improvement in BP and symptoms, lasix given for presumed CHF exacerbation

## 2018-06-20 NOTE — ED PROVIDER NOTE - PHYSICAL EXAMINATION
respiratory distress  NCAT  PERRL, EOMI  RRR  decrease b/l BS, tachypneic, labored   soft, NTND  skin normal, no rashes  AAOx3, motor/sensory grossly intact  no c/c/e

## 2018-06-20 NOTE — PROGRESS NOTE ADULT - PROBLEM SELECTOR PLAN 9
History of depression. Currently asymptomatic, no SI/HI.  - Continue with home bupropion XL 300mg. F: no IVF indicated at this time.  E: replete lytes prn.  N: DASH /TLC diet   Ppx: on Pradaxa.  Code: FULL CODE.

## 2018-06-20 NOTE — PROGRESS NOTE ADULT - SUBJECTIVE AND OBJECTIVE BOX
OVERNIGHT EVENTS: See H&P, pt admitted overnight    SUBJECTIVE / INTERVAL HPI: Patient seen and examined at bedside. Pt comfortable, no c/o sob, no chest pain    VITAL SIGNS:  Vital Signs Last 24 Hrs  T(C): 37.2 (2018 09:35), Max: 37.2 (2018 09:35)  T(F): 99 (2018 09:35), Max: 99 (2018 09:35)  HR: 74 (2018 12:38) (74 - 105)  BP: 124/83 (2018 12:38) (96/50 - 180/122)  BP(mean): 80 (2018 05:13) (80 - 80)  RR: 18 (2018 12:38) (18 - 30)  SpO2: 96% (2018 12:38) (93% - 100%)    PHYSICAL EXAM:    General: WDWN  HEENT: NC/AT; PERRL, anicteric sclera; MMM  Neck: supple  Cardiovascular: +S1/S2; RRR  Respiratory: CTA B/L; no W/R/R  Gastrointestinal: soft, NT/ND; +BSx4  Extremities: WWP; no edema, clubbing or cyanosis  Vascular: 2+ radial, DP/PT pulses B/L  Neurological: AAOx3; no focal deficits    MEDICATIONS:  MEDICATIONS  (STANDING):  aspirin  chewable 81 milliGRAM(s) Oral daily  atorvastatin 80 milliGRAM(s) Oral at bedtime  buDESOnide 160 MICROgram(s)/formoterol 4.5 MICROgram(s) Inhaler 2 Puff(s) Inhalation two times a day  dabigatran 150 milliGRAM(s) Oral every 12 hours  levothyroxine 75 MICROGram(s) Oral daily  montelukast 10 milliGRAM(s) Oral daily  sotalol 80 milliGRAM(s) Oral two times a day    MEDICATIONS  (PRN):  ALBUTerol/ipratropium for Nebulization 3 milliLiter(s) Nebulizer every 4 hours PRN Shortness of Breath and/or Wheezing      ALLERGIES:  Allergies    No Known Allergies    Intolerances        LABS:                        11.8   11.6  )-----------( 324      ( 2018 02:50 )             39.2     06-20    139  |  103  |  25<H>  ----------------------------<  131<H>  4.0   |  20<L>  |  0.99    Ca    9.2      2018 10:43    TPro  7.3  /  Alb  3.5  /  TBili  0.2  /  DBili  x   /  AST  46<H>  /  ALT  35  /  AlkPhos  86  06-20    PT/INR - ( 2018 02:50 )   PT: 14.4 sec;   INR: 1.30          PTT - ( 2018 02:50 )  PTT:73.1 sec  Urinalysis Basic - ( 2018 06:14 )    Color: Yellow / Appearance: Clear / S.015 / pH: x  Gluc: x / Ketone: NEGATIVE  / Bili: Negative / Urobili: 0.2 E.U./dL   Blood: x / Protein: NEGATIVE mg/dL / Nitrite: NEGATIVE   Leuk Esterase: NEGATIVE / RBC: 5-10 /HPF / WBC < 5 /HPF   Sq Epi: x / Non Sq Epi: 0-5 /HPF / Bacteria: Present /HPF      CAPILLARY BLOOD GLUCOSE          RADIOLOGY & ADDITIONAL TESTS: Reviewed.    ASSESSMENT:    PLAN: OVERNIGHT EVENTS: See H&P, pt admitted overnight    SUBJECTIVE / INTERVAL HPI: Patient seen and examined at bedside. Pt comfortable, no c/o sob, no chest pain    VITAL SIGNS:  Vital Signs Last 24 Hrs  T(C): 37.2 (2018 09:35), Max: 37.2 (2018 09:35)  T(F): 99 (2018 09:35), Max: 99 (2018 09:35)  HR: 74 (2018 12:38) (74 - 105)  BP: 124/83 (2018 12:38) (96/50 - 180/122)  BP(mean): 80 (2018 05:13) (80 - 80)  RR: 18 (2018 12:38) (18 - 30)  SpO2: 96% (2018 12:38) (93% - 100%)    PHYSICAL EXAM:    General: WDWN  HEENT: NC/AT; PERRL, anicteric sclera; MMM  Neck: supple  Cardiovascular: +S1/S2; RRR  Respiratory: CTA B/L; no W/R/R  Gastrointestinal: soft, NT/ND; +BSx4  Extremities: WWP; no edema, clubbing or cyanosis  Vascular: 2+ radial, DP/PT pulses B/L  Neurological: AAOx3; no focal deficits    MEDICATIONS:  MEDICATIONS  (STANDING):  aspirin  chewable 81 milliGRAM(s) Oral daily  atorvastatin 80 milliGRAM(s) Oral at bedtime  buDESOnide 160 MICROgram(s)/formoterol 4.5 MICROgram(s) Inhaler 2 Puff(s) Inhalation two times a day  dabigatran 150 milliGRAM(s) Oral every 12 hours  levothyroxine 75 MICROGram(s) Oral daily  montelukast 10 milliGRAM(s) Oral daily  sotalol 80 milliGRAM(s) Oral two times a day    MEDICATIONS  (PRN):  ALBUTerol/ipratropium for Nebulization 3 milliLiter(s) Nebulizer every 4 hours PRN Shortness of Breath and/or Wheezing      ALLERGIES:  Allergies    No Known Allergies    Intolerances        LABS:                        11.8   11.6  )-----------( 324      ( 2018 02:50 )             39.2     06-20    139  |  103  |  25<H>  ----------------------------<  131<H>  4.0   |  20<L>  |  0.99    Ca    9.2      2018 10:43    TPro  7.3  /  Alb  3.5  /  TBili  0.2  /  DBili  x   /  AST  46<H>  /  ALT  35  /  AlkPhos  86  06-20    PT/INR - ( 2018 02:50 )   PT: 14.4 sec;   INR: 1.30          PTT - ( 2018 02:50 )  PTT:73.1 sec  Urinalysis Basic - ( 2018 06:14 )    Color: Yellow / Appearance: Clear / S.015 / pH: x  Gluc: x / Ketone: NEGATIVE  / Bili: Negative / Urobili: 0.2 E.U./dL   Blood: x / Protein: NEGATIVE mg/dL / Nitrite: NEGATIVE   Leuk Esterase: NEGATIVE / RBC: 5-10 /HPF / WBC < 5 /HPF   Sq Epi: x / Non Sq Epi: 0-5 /HPF / Bacteria: Present /HPF      < from: TTE Echo w/Cont Complete (18 @ 12:02) >  Normal left ventricular size and wall thickness.Abnormal (paradoxical)   septal   motion consistent with RV pacemakerThe other walls are severely   hypokinetic.The left ventricular ejection fraction is estimated to be   20-25%The mitral inflow pattern is consistent with pseudo-normalization,   suggestive of moderately elevated left atrial pressure (15-20mmHg).    Right   atrial size is normal.There is a pacemaker wire in the right   heart.Structurally normal aortic valve.No aortic regurgitation   noted.Structurally normal mitral valve.No mitral regurgitation   noted.Structurally normal tricuspid valve.There is mild to moderate   tricuspid   regurgitation.There is no echocardiographic evidence for pulmonary   hypertension.Structurally normal pulmonic valve.No aortic root   dilatation.There is no pericardial effusion.  Procedure Details  A complete two-dimensional transthoracic echocardiogram was performed (2D,  M-mode, spectral and color flow doppler).  Study Quality: Good.  Contrast injection with Definity performed  Left Ventricle  Normal left ventricular size and wall thickness.  Abnormal (paradoxical) septal motion consistent with RV pacemaker  The other walls are severely hypokinetic.  The left ventricular ejection fraction is estimated to be 20-25%  Left Atrium  The mitral inflow pattern is consistent with pseudo-normalization,   suggestive  of moderately elevated left atrial pressure (15-20mmHg).  Right Atrium  Right atrial size is normal.  Right Ventricle  There is a pacemaker wire in the right heart.  Aortic Valve  Structurally normal aortic valve.  No aortic regurgitation noted.  Mitral Valve  Structurally normal mitral valve.  No mitral regurgitation noted.  Tricuspid Valve  Structurally normal tricuspid valve.  There is mild to moderate tricuspid regurgitation.  There is no echocardiographic evidence for pulmonary hypertension.  Pulmonic Valve  Structurally normal pulmonic valve.  Arteries and Venous System  No aortic root dilatation.  Pericardium / Pleura  There is no pericardial effusion.    < end of copied text >  : OVERNIGHT EVENTS: See H&P, pt admitted overnight    SUBJECTIVE / INTERVAL HPI: Patient seen and examined at bedside. Pt comfortable, no c/o sob, no chest pain  Collateral obtained from Dr Hallie Breen Cardiologist  Last saw pt 1-1.5 years ago  Pt last echo done in 2016 EF 50-55%  last ekg no true left bundle had prolonged qrs  Told her about pt current echo reports with EF 25 percent, concern for evolution of bundle branch block, independent of med non compliance and HTN causing flash pulm edema  In , EF was 35%  VITAL SIGNS:  Vital Signs Last 24 Hrs  T(C): 37.2 (2018 09:35), Max: 37.2 (2018 09:35)  T(F): 99 (2018 09:35), Max: 99 (2018 09:35)  HR: 74 (2018 12:38) (74 - 105)  BP: 124/83 (2018 12:38) (96/50 - 180/122)  BP(mean): 80 (2018 05:13) (80 - 80)  RR: 18 (2018 12:38) (18 - 30)  SpO2: 96% (2018 12:38) (93% - 100%)    PHYSICAL EXAM:    General: WDWN  HEENT: NC/AT; PERRL, anicteric sclera; MMM  Neck: supple  Cardiovascular: +S1/S2; RRR  Respiratory: CTA B/L; no W/R/R  Gastrointestinal: soft, NT/ND; +BSx4  Extremities: WWP; no edema, clubbing or cyanosis  Vascular: 2+ radial, DP/PT pulses B/L  Neurological: AAOx3; no focal deficits    MEDICATIONS:  MEDICATIONS  (STANDING):  aspirin  chewable 81 milliGRAM(s) Oral daily  atorvastatin 80 milliGRAM(s) Oral at bedtime  buDESOnide 160 MICROgram(s)/formoterol 4.5 MICROgram(s) Inhaler 2 Puff(s) Inhalation two times a day  dabigatran 150 milliGRAM(s) Oral every 12 hours  levothyroxine 75 MICROGram(s) Oral daily  montelukast 10 milliGRAM(s) Oral daily  sotalol 80 milliGRAM(s) Oral two times a day    MEDICATIONS  (PRN):  ALBUTerol/ipratropium for Nebulization 3 milliLiter(s) Nebulizer every 4 hours PRN Shortness of Breath and/or Wheezing      ALLERGIES:  Allergies    No Known Allergies    Intolerances        LABS:                        11.8   11.6  )-----------( 324      ( 2018 02:50 )             39.2         139  |  103  |  25<H>  ----------------------------<  131<H>  4.0   |  20<L>  |  0.99    Ca    9.2      2018 10:43    TPro  7.3  /  Alb  3.5  /  TBili  0.2  /  DBili  x   /  AST  46<H>  /  ALT  35  /  AlkPhos  86      PT/INR - ( 2018 02:50 )   PT: 14.4 sec;   INR: 1.30          PTT - ( 2018 02:50 )  PTT:73.1 sec  Urinalysis Basic - ( 2018 06:14 )    Color: Yellow / Appearance: Clear / S.015 / pH: x  Gluc: x / Ketone: NEGATIVE  / Bili: Negative / Urobili: 0.2 E.U./dL   Blood: x / Protein: NEGATIVE mg/dL / Nitrite: NEGATIVE   Leuk Esterase: NEGATIVE / RBC: 5-10 /HPF / WBC < 5 /HPF   Sq Epi: x / Non Sq Epi: 0-5 /HPF / Bacteria: Present /HPF      < from: TTE Echo w/Cont Complete (18 @ 12:02) >  Normal left ventricular size and wall thickness.Abnormal (paradoxical)   septal   motion consistent with RV pacemakerThe other walls are severely   hypokinetic.The left ventricular ejection fraction is estimated to be   20-25%The mitral inflow pattern is consistent with pseudo-normalization,   suggestive of moderately elevated left atrial pressure (15-20mmHg).    Right   atrial size is normal.There is a pacemaker wire in the right   heart.Structurally normal aortic valve.No aortic regurgitation   noted.Structurally normal mitral valve.No mitral regurgitation   noted.Structurally normal tricuspid valve.There is mild to moderate   tricuspid   regurgitation.There is no echocardiographic evidence for pulmonary   hypertension.Structurally normal pulmonic valve.No aortic root   dilatation.There is no pericardial effusion.  Procedure Details  A complete two-dimensional transthoracic echocardiogram was performed (2D,  M-mode, spectral and color flow doppler).  Study Quality: Good.  Contrast injection with Definity performed  Left Ventricle  Normal left ventricular size and wall thickness.  Abnormal (paradoxical) septal motion consistent with RV pacemaker  The other walls are severely hypokinetic.  The left ventricular ejection fraction is estimated to be 20-25%  Left Atrium  The mitral inflow pattern is consistent with pseudo-normalization,   suggestive  of moderately elevated left atrial pressure (15-20mmHg).  Right Atrium  Right atrial size is normal.  Right Ventricle  There is a pacemaker wire in the right heart.  Aortic Valve  Structurally normal aortic valve.  No aortic regurgitation noted.  Mitral Valve  Structurally normal mitral valve.  No mitral regurgitation noted.  Tricuspid Valve  Structurally normal tricuspid valve.  There is mild to moderate tricuspid regurgitation.  There is no echocardiographic evidence for pulmonary hypertension.  Pulmonic Valve  Structurally normal pulmonic valve.  Arteries and Venous System  No aortic root dilatation.  Pericardium / Pleura  There is no pericardial effusion.    < end of copied text >  : OVERNIGHT EVENTS: See H&P, pt admitted overnight    SUBJECTIVE / INTERVAL HPI: Patient seen and examined at bedside. Pt comfortable, no c/o sob, no chest pain  collateral information obtained is in the chart- Dr Hallie Breen.   Pt received cath in  2 stents to RCA, subsequently 3 months later received 1 stent to mid LAD  In Aug 2011, [facility] at [facility]  Oct 2011 pt admitted to [facility] for sob, ef reduced to 10-15%-concern for Takotsubo cardiomyopathy, however repeat echo in oct 11 showed resolution of wall motion abnormalities and recovery of LV systolic function  In [place] 2015 : pacemaker placed  in [place] 2015  admission for chf exacerbation requiring intubation  ECHO :  2017:40-45%  2018 in [place] : EF 35% , Ar per Dr Yost he was concerned for drop and pt was informed regarding plan for possible angio however she never returned.   As per Dr Hallie Breen no LBBB seen on previous EKG      VITAL SIGNS:  Vital Signs Last 24 Hrs  T(C): 37.2 (2018 09:35), Max: 37.2 (2018 09:35)  T(F): 99 (2018 09:35), Max: 99 (2018 09:35)  HR: 74 (2018 12:38) (74 - 105)  BP: 124/83 (2018 12:38) (96/50 - 180/122)  BP(mean): 80 (2018 05:13) (80 - 80)  RR: 18 (2018 12:38) (18 - 30)  SpO2: 96% (2018 12:38) (93% - 100%)    PHYSICAL EXAM:    General: WDWN  HEENT: NC/AT; PERRL, anicteric sclera; MMM  Neck: supple  Cardiovascular: +S1/S2; RRR  Respiratory: CTA B/L; no W/R/R  Gastrointestinal: soft, NT/ND; +BSx4  Extremities: WWP; no edema, clubbing or cyanosis  Vascular: 2+ radial, DP/PT pulses B/L  Neurological: AAOx3; no focal deficits    MEDICATIONS:  MEDICATIONS  (STANDING):  aspirin  chewable 81 milliGRAM(s) Oral daily  atorvastatin 80 milliGRAM(s) Oral at bedtime  buDESOnide 160 MICROgram(s)/formoterol 4.5 MICROgram(s) Inhaler 2 Puff(s) Inhalation two times a day  dabigatran 150 milliGRAM(s) Oral every 12 hours  levothyroxine 75 MICROGram(s) Oral daily  montelukast 10 milliGRAM(s) Oral daily  sotalol 80 milliGRAM(s) Oral two times a day    MEDICATIONS  (PRN):  ALBUTerol/ipratropium for Nebulization 3 milliLiter(s) Nebulizer every 4 hours PRN Shortness of Breath and/or Wheezing      ALLERGIES:  Allergies    No Known Allergies    Intolerances        LABS:                        11.8   11.6  )-----------( 324      ( 2018 02:50 )             39.2         139  |  103  |  25<H>  ----------------------------<  131<H>  4.0   |  20<L>  |  0.99    Ca    9.2      2018 10:43    TPro  7.3  /  Alb  3.5  /  TBili  0.2  /  DBili  x   /  AST  46<H>  /  ALT  35  /  AlkPhos  86      PT/INR - ( 2018 02:50 )   PT: 14.4 sec;   INR: 1.30          PTT - ( 2018 02:50 )  PTT:73.1 sec  Urinalysis Basic - ( 2018 06:14 )    Color: Yellow / Appearance: Clear / S.015 / pH: x  Gluc: x / Ketone: NEGATIVE  / Bili: Negative / Urobili: 0.2 E.U./dL   Blood: x / Protein: NEGATIVE mg/dL / Nitrite: NEGATIVE   Leuk Esterase: NEGATIVE / RBC: 5-10 /HPF / WBC < 5 /HPF   Sq Epi: x / Non Sq Epi: 0-5 /HPF / Bacteria: Present /HPF      < from: TTE Echo w/Cont Complete (18 @ 12:02) >  Normal left ventricular size and wall thickness.Abnormal (paradoxical)   septal   motion consistent with RV pacemakerThe other walls are severely   hypokinetic.The left ventricular ejection fraction is estimated to be   20-25%The mitral inflow pattern is consistent with pseudo-normalization,   suggestive of moderately elevated left atrial pressure (15-20mmHg).    Right   atrial size is normal.There is a pacemaker wire in the right   heart.Structurally normal aortic valve.No aortic regurgitation   noted.Structurally normal mitral valve.No mitral regurgitation   noted.Structurally normal tricuspid valve.There is mild to moderate   tricuspid   regurgitation.There is no echocardiographic evidence for pulmonary   hypertension.Structurally normal pulmonic valve.No aortic root   dilatation.There is no pericardial effusion.  Procedure Details  A complete two-dimensional transthoracic echocardiogram was performed (2D,  M-mode, spectral and color flow doppler).  Study Quality: Good.  Contrast injection with Definity performed  Left Ventricle  Normal left ventricular size and wall thickness.  Abnormal (paradoxical) septal motion consistent with RV pacemaker  The other walls are severely hypokinetic.  The left ventricular ejection fraction is estimated to be 20-25%  Left Atrium  The mitral inflow pattern is consistent with pseudo-normalization,   suggestive  of moderately elevated left atrial pressure (15-20mmHg).  Right Atrium  Right atrial size is normal.  Right Ventricle  There is a pacemaker wire in the right heart.  Aortic Valve  Structurally normal aortic valve.  No aortic regurgitation noted.  Mitral Valve  Structurally normal mitral valve.  No mitral regurgitation noted.  Tricuspid Valve  Structurally normal tricuspid valve.  There is mild to moderate tricuspid regurgitation.  There is no echocardiographic evidence for pulmonary hypertension.  Pulmonic Valve  Structurally normal pulmonic valve.  Arteries and Venous System  No aortic root dilatation.  Pericardium / Pleura  There is no pericardial effusion.    < end of copied text >  :

## 2018-06-20 NOTE — H&P ADULT - PROBLEM SELECTOR PROBLEM 4
Nutrition, metabolism, and development symptoms Need for prophylactic measure CAD (coronary artery disease) Pacemaker

## 2018-06-20 NOTE — PROGRESS NOTE ADULT - PROBLEM SELECTOR PLAN 6
Initially labs with BUN/Cr 23/1.44. Unknown baseline. Differential includes KANDY vs. CKD.  - Check urine lytes to elucidate etiology of elevated Cr. Checking FEUrea in the setting of lasix use.  - Obtain collateral from PMD. History of COPD. Not currently in exacerbation, but initially with mild end expiratory wheezes secondary to medication non-compliance.  - Duonebs q4h prn for wheezing/SOB.  - Continue with home doses of montelukast 10mg qd and advair 250/50.  - Supplemental O2 as needed.

## 2018-06-20 NOTE — H&P ADULT - PROBLEM SELECTOR PLAN 7
History of COPD. Not currently in exacerbation, but initially with mild end expiratory wheezes secondary to medication non-compliance.  - Duonebs q4h prn for wheezing/SOB.  - Continue with home doses of montelukast 10mg qd and advair 250/50.  - Supplemental O2 as needed.

## 2018-06-20 NOTE — H&P ADULT - PROBLEM SELECTOR PLAN 10
F: no IVF indicated at this time.  E: replete lytes prn.  N: NPO on BIPAP. DASH diet when appropriate. F: no IVF indicated at this time.  E: replete lytes prn.  N: NPO on BIPAP. DASH diet when appropriate.  Ppx: on Pradaxa.  Code: FULL CODE. F: no IVF indicated at this time.  E: replete lytes prn.  N: NPO on BIPAP. DASH diet when appropriate.    Ppx: on Pradaxa.  Code: FULL CODE.

## 2018-06-20 NOTE — H&P ADULT - NSHPPHYSICALEXAM_GEN_ALL_CORE
.  VITAL SIGNS:  T(C): 36.7 (06-20-18 @ 04:30), Max: 36.7 (06-20-18 @ 02:15)  T(F): 98 (06-20-18 @ 04:30), Max: 98 (06-20-18 @ 02:15)  HR: 84 (06-20-18 @ 05:13) (84 - 105)  BP: 101/70 (06-20-18 @ 05:13) (101/70 - 180/122)  BP(mean): 80 (06-20-18 @ 05:13) (80 - 80)  RR: 20 (06-20-18 @ 05:13) (18 - 30)  SpO2: 100% (06-20-18 @ 05:13) (93% - 100%)  Wt(kg): --    PHYSICAL EXAM:    General: NAD, comfortable on 4L  HEENT: NCAT, PERRL, clear conjunctiva, no scleral icterus  Neck: supple, no JVD noted (negative hepatojugular reflux)  Respiratory: fine crackles at the bases b/l, very mild end expiratory wheezes noted, otherwise no rhonchi and moving air well b/l; no respiratory distress or labored breathing  Cardiovascular: RRR, normal S1S2, no M/R/G  Abdomen: soft, NT/ND, bowel sounds in all four quadrants, no palpable masses  Extremities: cool extremities, no edema noted  Neuro: AOx3

## 2018-06-20 NOTE — PROGRESS NOTE ADULT - ASSESSMENT
73 y/o F with PMHx of HFrEF (last TTE 1 year ago, reportedly EF 25%), atrial fibrillation (on Pradaxa) s/p PPM, CAD s/p PCI x 2 (unk vessels, NYP in 2010), COPD, HTN, hypothyroidism, depression, breast cancer in remission s/p bilateral mastectomy (1992), BIBEMS for acute onset SOB earlier tonight in the setting of not taking her diuretics for the past two days, now admitted for further management.

## 2018-06-20 NOTE — PROGRESS NOTE ADULT - PROBLEM SELECTOR PLAN 2
Plan as above. As per Dr Yost, outpatient cardiologist in [place]   last Echo showed ef 35-39% %, with moderately generalized hypokinetic left ventricle  of note pt was admitted 3 years ago in [place] for exacerbation of CHF had an EF of 20 % , hospital course was complicated by intubation, stay in ICU for 5 days  #LBBB - as per collateral obtained from Dr Yost, pt previously had LBBB secondary to pacemaker As per Dr Yost, outpatient cardiologist in [place]   last Echo showed ef 35-39% %, with moderately generalized hypokinetic left ventricle  of note pt was admitted 3 years ago in [place] for exacerbation of CHF had an EF of 20 % , hospital course was complicated by intubation, stay in ICU for 5 days  #LBBB - as per collateral obtained from Dr Yost, pt previously had LBBB secondary to pacemaker  continue with management as above As per Dr Yost, outpatient cardiologist in [place]   last Echo done march 15 , showed ef 35-39% %, with moderately generalized hypokinetic left ventricle  of note pt was admitted 3 years ago in [place] for exacerbation of CHF had an EF of 20 % , hospital course was complicated by intubation, stay in ICU for 5 days  #LBBB - as per collateral obtained from Dr Yost, pt previously had LBBB secondary to pacemaker  continue with management as above

## 2018-06-21 VITALS
OXYGEN SATURATION: 98 % | RESPIRATION RATE: 15 BRPM | DIASTOLIC BLOOD PRESSURE: 61 MMHG | SYSTOLIC BLOOD PRESSURE: 109 MMHG | HEART RATE: 82 BPM

## 2018-06-21 LAB
ANION GAP SERPL CALC-SCNC: 15 MMOL/L — SIGNIFICANT CHANGE UP (ref 5–17)
BUN SERPL-MCNC: 18 MG/DL — SIGNIFICANT CHANGE UP (ref 7–23)
CALCIUM SERPL-MCNC: 9.2 MG/DL — SIGNIFICANT CHANGE UP (ref 8.4–10.5)
CHLORIDE SERPL-SCNC: 100 MMOL/L — SIGNIFICANT CHANGE UP (ref 96–108)
CO2 SERPL-SCNC: 23 MMOL/L — SIGNIFICANT CHANGE UP (ref 22–31)
CREAT SERPL-MCNC: 0.87 MG/DL — SIGNIFICANT CHANGE UP (ref 0.5–1.3)
GLUCOSE SERPL-MCNC: 99 MG/DL — SIGNIFICANT CHANGE UP (ref 70–99)
HCT VFR BLD CALC: 32.8 % — LOW (ref 34.5–45)
HGB BLD-MCNC: 10.7 G/DL — LOW (ref 11.5–15.5)
MAGNESIUM SERPL-MCNC: 1.8 MG/DL — SIGNIFICANT CHANGE UP (ref 1.6–2.6)
MCHC RBC-ENTMCNC: 27.4 PG — SIGNIFICANT CHANGE UP (ref 27–34)
MCHC RBC-ENTMCNC: 32.6 G/DL — SIGNIFICANT CHANGE UP (ref 32–36)
MCV RBC AUTO: 84.1 FL — SIGNIFICANT CHANGE UP (ref 80–100)
PHOSPHATE SERPL-MCNC: 2.7 MG/DL — SIGNIFICANT CHANGE UP (ref 2.5–4.5)
PLATELET # BLD AUTO: 228 K/UL — SIGNIFICANT CHANGE UP (ref 150–400)
POTASSIUM SERPL-MCNC: 3.5 MMOL/L — SIGNIFICANT CHANGE UP (ref 3.5–5.3)
POTASSIUM SERPL-SCNC: 3.5 MMOL/L — SIGNIFICANT CHANGE UP (ref 3.5–5.3)
RBC # BLD: 3.9 M/UL — SIGNIFICANT CHANGE UP (ref 3.8–5.2)
RBC # FLD: 13.5 % — SIGNIFICANT CHANGE UP (ref 10.3–16.9)
SODIUM SERPL-SCNC: 138 MMOL/L — SIGNIFICANT CHANGE UP (ref 135–145)
WBC # BLD: 9 K/UL — SIGNIFICANT CHANGE UP (ref 3.8–10.5)
WBC # FLD AUTO: 9 K/UL — SIGNIFICANT CHANGE UP (ref 3.8–10.5)

## 2018-06-21 PROCEDURE — 83880 ASSAY OF NATRIURETIC PEPTIDE: CPT

## 2018-06-21 PROCEDURE — 99291 CRITICAL CARE FIRST HOUR: CPT | Mod: 25

## 2018-06-21 PROCEDURE — 85730 THROMBOPLASTIN TIME PARTIAL: CPT

## 2018-06-21 PROCEDURE — 84443 ASSAY THYROID STIM HORMONE: CPT

## 2018-06-21 PROCEDURE — 97161 PT EVAL LOW COMPLEX 20 MIN: CPT

## 2018-06-21 PROCEDURE — 82550 ASSAY OF CK (CPK): CPT

## 2018-06-21 PROCEDURE — 83735 ASSAY OF MAGNESIUM: CPT

## 2018-06-21 PROCEDURE — 80053 COMPREHEN METABOLIC PANEL: CPT

## 2018-06-21 PROCEDURE — 51702 INSERT TEMP BLADDER CATH: CPT

## 2018-06-21 PROCEDURE — 83036 HEMOGLOBIN GLYCOSYLATED A1C: CPT

## 2018-06-21 PROCEDURE — 93005 ELECTROCARDIOGRAM TRACING: CPT | Mod: XU

## 2018-06-21 PROCEDURE — 94640 AIRWAY INHALATION TREATMENT: CPT

## 2018-06-21 PROCEDURE — 80061 LIPID PANEL: CPT

## 2018-06-21 PROCEDURE — 84540 ASSAY OF URINE/UREA-N: CPT

## 2018-06-21 PROCEDURE — 82553 CREATINE MB FRACTION: CPT

## 2018-06-21 PROCEDURE — 99239 HOSP IP/OBS DSCHRG MGMT >30: CPT

## 2018-06-21 PROCEDURE — 82803 BLOOD GASES ANY COMBINATION: CPT

## 2018-06-21 PROCEDURE — 71045 X-RAY EXAM CHEST 1 VIEW: CPT

## 2018-06-21 PROCEDURE — 36415 COLL VENOUS BLD VENIPUNCTURE: CPT

## 2018-06-21 PROCEDURE — 85027 COMPLETE CBC AUTOMATED: CPT

## 2018-06-21 PROCEDURE — 82570 ASSAY OF URINE CREATININE: CPT

## 2018-06-21 PROCEDURE — C8929: CPT

## 2018-06-21 PROCEDURE — 84100 ASSAY OF PHOSPHORUS: CPT

## 2018-06-21 PROCEDURE — 84300 ASSAY OF URINE SODIUM: CPT

## 2018-06-21 PROCEDURE — 81001 URINALYSIS AUTO W/SCOPE: CPT

## 2018-06-21 PROCEDURE — 85025 COMPLETE CBC W/AUTO DIFF WBC: CPT

## 2018-06-21 PROCEDURE — 80048 BASIC METABOLIC PNL TOTAL CA: CPT

## 2018-06-21 PROCEDURE — 85610 PROTHROMBIN TIME: CPT

## 2018-06-21 PROCEDURE — 84484 ASSAY OF TROPONIN QUANT: CPT

## 2018-06-21 PROCEDURE — 96374 THER/PROPH/DIAG INJ IV PUSH: CPT | Mod: XU

## 2018-06-21 PROCEDURE — 71045 X-RAY EXAM CHEST 1 VIEW: CPT | Mod: 26

## 2018-06-21 RX ORDER — SOTALOL HCL 120 MG
80 TABLET ORAL
Qty: 0 | Refills: 0 | Status: DISCONTINUED | OUTPATIENT
Start: 2018-06-21 | End: 2018-06-21

## 2018-06-21 RX ORDER — METOPROLOL TARTRATE 50 MG
25 TABLET ORAL DAILY
Qty: 0 | Refills: 0 | Status: CANCELLED | OUTPATIENT
Start: 2018-06-22 | End: 2018-06-21

## 2018-06-21 RX ORDER — ROSUVASTATIN CALCIUM 5 MG/1
1 TABLET ORAL
Qty: 0 | Refills: 0 | COMMUNITY

## 2018-06-21 RX ORDER — METOPROLOL TARTRATE 50 MG
1 TABLET ORAL
Qty: 30 | Refills: 0 | OUTPATIENT
Start: 2018-06-21 | End: 2018-07-20

## 2018-06-21 RX ORDER — SOTALOL HCL 120 MG
1 TABLET ORAL
Qty: 0 | Refills: 0 | COMMUNITY

## 2018-06-21 RX ORDER — SOTALOL HCL 120 MG
1 TABLET ORAL
Qty: 0 | Refills: 0 | COMMUNITY
Start: 2018-06-21

## 2018-06-21 RX ORDER — FUROSEMIDE 40 MG
1 TABLET ORAL
Qty: 0 | Refills: 0 | COMMUNITY

## 2018-06-21 RX ORDER — METOPROLOL TARTRATE 50 MG
12.5 TABLET ORAL ONCE
Qty: 0 | Refills: 0 | Status: DISCONTINUED | OUTPATIENT
Start: 2018-06-21 | End: 2018-06-21

## 2018-06-21 RX ORDER — METOPROLOL TARTRATE 50 MG
1 TABLET ORAL
Qty: 0 | Refills: 0 | COMMUNITY

## 2018-06-21 RX ORDER — FUROSEMIDE 40 MG
1 TABLET ORAL
Qty: 30 | Refills: 0 | OUTPATIENT
Start: 2018-06-21 | End: 2018-07-20

## 2018-06-21 RX ORDER — DABIGATRAN ETEXILATE MESYLATE 150 MG/1
150 CAPSULE ORAL EVERY 12 HOURS
Qty: 0 | Refills: 0 | Status: DISCONTINUED | OUTPATIENT
Start: 2018-06-21 | End: 2018-06-21

## 2018-06-21 RX ORDER — ATORVASTATIN CALCIUM 80 MG/1
1 TABLET, FILM COATED ORAL
Qty: 0 | Refills: 0 | COMMUNITY
Start: 2018-06-21

## 2018-06-21 RX ORDER — SOTALOL HCL 120 MG
80 TABLET ORAL EVERY 12 HOURS
Qty: 0 | Refills: 0 | Status: DISCONTINUED | OUTPATIENT
Start: 2018-06-21 | End: 2018-06-21

## 2018-06-21 RX ADMIN — Medication 81 MILLIGRAM(S): at 09:50

## 2018-06-21 RX ADMIN — DABIGATRAN ETEXILATE MESYLATE 150 MILLIGRAM(S): 150 CAPSULE ORAL at 09:50

## 2018-06-21 RX ADMIN — Medication 20 MILLIGRAM(S): at 06:54

## 2018-06-21 RX ADMIN — BUDESONIDE AND FORMOTEROL FUMARATE DIHYDRATE 2 PUFF(S): 160; 4.5 AEROSOL RESPIRATORY (INHALATION) at 06:54

## 2018-06-21 RX ADMIN — MONTELUKAST 10 MILLIGRAM(S): 4 TABLET, CHEWABLE ORAL at 09:50

## 2018-06-21 RX ADMIN — Medication 80 MILLIGRAM(S): at 09:49

## 2018-06-21 RX ADMIN — Medication 75 MICROGRAM(S): at 06:54

## 2018-06-21 RX ADMIN — Medication 12.5 MILLIGRAM(S): at 06:54

## 2018-06-21 NOTE — DISCHARGE NOTE ADULT - MEDICATION SUMMARY - MEDICATIONS TO STOP TAKING
I will STOP taking the medications listed below when I get home from the hospital:    Betapace 80 mg oral tablet  -- 1 tab(s) by mouth 2 times a day    Crestor 40 mg oral tablet  -- 1 tab(s) by mouth once a day (at bedtime) I will STOP taking the medications listed below when I get home from the hospital:  None

## 2018-06-21 NOTE — DISCHARGE NOTE ADULT - SECONDARY DIAGNOSIS.
LBBB (left bundle branch block) HFrEF (heart failure with reduced ejection fraction) HTN (hypertension) Pacemaker Atrial fibrillation CAD (coronary artery disease)

## 2018-06-21 NOTE — DISCHARGE NOTE ADULT - PLAN OF CARE
To achieve resolution of your symptoms You presented with sob likely attributed to pain and medication non compliance causing you to develop pulm edema. We treated you with iv diuresis and you improved. Please f/u with your cardiologist as instructed at [facility]-Dr Nuha Sterling You presented with sob , were found to have a LBBB and also have elevated troponin which peaked.  (0.04--> 0.05---> 0.03). Your echo showed a decreased EF to 25% which was concerning and plan for cath was made however you chose to proceed with further treatment and [facility]. Please f/u with your cardiologist at [facility] for further care as instructed You have a PMH of HFREF. Your last echo done By Dr Yost in [place] showed an EF of 35%, echo done on this admission revealed an EF of 25%. The drop in EF and new LBBB was concerning and plan for cath was made however you chose to proceed with further treatment and [facility]. Please f/u with your cardiologist at [facility] for further care as instructed You have a PMH of HTN , please continue your medications as instructed You have a Pacemaker implanted in 2015, device was interrogated by EP here. Please continue follow up with your cardiologist You have a PMH of afib, please continue with Pradaxa and metoprolol succinate in setting of HFREF. You were previously on sotalol which we discontinued You have a PMH of cad, please continue your meds and f/u with your cardiologist at [facility] You have a PMH of afib, please continue with Pradaxa , sotalol and f/u with your cardiologist outpatient. You have a PMH of cad, please continue your meds and f/u with your cardiologist at [facility]. We also started your on metoprolol succinate in setting of decreased EF. Please f/u with your cardiologist

## 2018-06-21 NOTE — DISCHARGE NOTE ADULT - PATIENT PORTAL LINK FT
You can access the [facility] Patient Portal, offered by [facility], by registering with the following website: [email or web address]

## 2018-06-21 NOTE — DISCHARGE NOTE ADULT - HOSPITAL COURSE
73 y/o F with PMHx of HFrEF ,cad s/p [id number] 2010 and one to m LAD 2010, HFrEF, atrial fibrillation (on Pradaxa ) s/p PPM,  s/p catheter ablation (2011 at [facility] )COPD, HTN, hypothyroidism, depression, breast cancer in remission s/p bilateral mastectomy (1992), BIBEMS for acute onset SOB earlier tonight in the setting of not taking her diuretics for the past two days.    States that she was in her usual state of health when she went to bed. Awoke from sleep, walked down the stairs to go get some water, when she tripped and fell near the bottom of the stairs. Denies head trauma or LOC. She then felt very anxious and acutely SOB.  Patient called EMS and was found to have labored breathing, hypertensive and satting 70% upon arrival. Placed on CPAP with improvement to 90s. At [facility] ED, VS T 98, HR 99, /122, RR 30, O2 93 on CPAP. Patient was unable to speak more than 1 word, converted to bipap 15/10 with improvement in sats to 100. Labs notable for WBC 11.6, AG 18, BUN/Cr 23/1.44, glucose 268, coags elevated (PTT 73.1, INR 1.30), trop neg x 1, BNP 1893. CXR with pulmonary vascular congestion. EKG NSR 94 with a NEW LBBB. Given lasix 80mg IVP x 1, NTG SL x 2, ASA 325mg. Continued on BIPAP.   Upon arrival to [facility] [address], BIPAP was removed and VS HR 85, /70, RR 18, O2 99 on 4L. Patient with no complaints, respirations non-labored. UOP -600cc post-Lasix. Pt troponins peaked   (0.04--> 0.05---> 0.03)  During stay your pacemaker was interrogated by EP-Pacemaker dependency:  NO AP 92%,  <0.1%. normal device check.  She should follow up with her primary EP doctor as scheduled.  No parameter changes.  paroxysmal afib with a low burden <1% on pradaxa- there were no arrhythmias that correlate with her hospital stay / SOB  Pt echo revealed decreased EF to 20-25 % , abnormal septal wall consistent with rv pacemaker. The other walls are severely hypokinetic. Of note collateral obtained from Dr Yost showed pt to have an EF of 35% in march 2018. Pt previously had an EF of 50-55% in 2016.  Your sotalol was discontinued and we transitioned you to metoprolol in the setting of your decreased EF.  In setting of new LBBB and echo findings showing drop in EF plan was made to cath the patient however pt refused to proceed with cath at [facility] and was discharged with instructions to follow up at [facility] with her cardiologist for cath, Dr Nuha Sterling,. Spoke to Dr Sterling who was aware of patients discharge with plan to proceed with further treatment at [facility]. 71 y/o F with PMHx of HFrEF ,cad s/p [id number] 2010 and one to m LAD 2010, HFrEF, atrial fibrillation (on Pradaxa ) s/p PPM,  s/p catheter ablation (2011 at [facility] )COPD, HTN, hypothyroidism, depression, breast cancer in remission s/p bilateral mastectomy (1992), BIBEMS for acute onset SOB earlier tonight in the setting of not taking her diuretics for the past two days.    States that she was in her usual state of health when she went to bed. Awoke from sleep, walked down the stairs to go get some water, when she tripped and fell near the bottom of the stairs. Denies head trauma or LOC. She then felt very anxious and acutely SOB.  Patient called EMS and was found to have labored breathing, hypertensive and satting 70% upon arrival. Placed on CPAP with improvement to 90s. At [facility] ED, VS T 98, HR 99, /122, RR 30, O2 93 on CPAP. Patient was unable to speak more than 1 word, converted to bipap 15/10 with improvement in sats to 100. Labs notable for WBC 11.6, AG 18, BUN/Cr 23/1.44, glucose 268, coags elevated (PTT 73.1, INR 1.30), trop neg x 1, BNP 1893. CXR with pulmonary vascular congestion. EKG NSR 94 with a NEW LBBB. Given lasix 80mg IVP x 1, NTG SL x 2, ASA 325mg. Continued on BIPAP.   Upon arrival to [facility], BIPAP was removed and VS HR 85, /70, RR 18, O2 99 on 4L. Patient with no complaints, respirations non-labored. UOP -600cc post-Lasix. Pt troponins peaked   (0.04--> 0.05---> 0.03)  During stay your pacemaker was interrogated by EP-Pacemaker dependency:  NO AP 92%,  <0.1%. normal device check.  She should follow up with her primary EP doctor as scheduled.  No parameter changes.  paroxysmal afib with a low burden <1% on pradaxa- there were no arrhythmias that correlate with her hospital stay / SOB  Pt echo revealed decreased EF to 20-25 % , abnormal septal wall consistent with rv pacemaker. The other walls are severely hypokinetic. Of note collateral obtained from Dr Yost showed pt to have an EF of 35% in march 2018. Pt previously had an EF of 50-55% in 2016.  We continued the sotalol and also initiated metoprolol in the setting of your decreased EF upon EP recommendations   In setting of new LBBB and echo findings showing drop in EF plan was made to cath the patient however pt refused to proceed with cath at [facility] and was discharged with instructions to follow up at [facility] with her cardiologist for cath, Dr Nuha Sterling,. Spoke to Dr Sterling who was aware of patients discharge with plan to proceed with further treatment at [facility]. 73 y/o F with PMHx of HFrEF ,cad s/p [id number] 2010 and one to m LAD 2010, HFrEF, atrial fibrillation (on Pradaxa ) s/p PPM,  s/p catheter ablation (2011 at [facility] )COPD, HTN, hypothyroidism, depression, breast cancer in remission s/p bilateral mastectomy (1992), BIBEMS for acute onset SOB earlier tonight in the setting of not taking her diuretics for the past two days.    States that she was in her usual state of health when she went to bed. Awoke from sleep, walked down the stairs to go get some water, when she tripped and fell near the bottom of the stairs. Denies head trauma or LOC. She then felt very anxious and acutely SOB.  Patient called EMS and was found to have labored breathing, hypertensive and satting 70% upon arrival. Placed on CPAP with improvement to 90s. At [facility] ED, VS T 98, HR 99, /122, RR 30, O2 93 on CPAP. Patient was unable to speak more than 1 word, converted to bipap 15/10 with improvement in sats to 100. Labs notable for WBC 11.6, AG 18, BUN/Cr 23/1.44, glucose 268, coags elevated (PTT 73.1, INR 1.30), trop neg x 1, BNP 1893. CXR with pulmonary vascular congestion. EKG NSR 94 with a NEW LBBB. Given lasix 80mg IVP x 1, NTG SL x 2, ASA 325mg. Continued on BIPAP.   Upon arrival to [facility], BIPAP was removed and VS HR 85, /70, RR 18, O2 99 on 4L. Patient with no complaints, respirations non-labored. UOP -600cc post-Lasix. Pt troponins peaked   (0.04--> 0.05---> 0.03)  During stay your pacemaker was interrogated by EP-Pacemaker dependency:  NO AP 92%,  <0.1%. normal device check.  No parameter changes.  paroxysmal afib with a low burden <1% on pradaxa- there were no arrhythmias that correlate with her hospital stay / SOB  Pt echo revealed decreased EF to 20-25 % , abnormal septal wall consistent with rv pacemaker. The other walls are severely hypokinetic. Of note collateral obtained from Dr Yost showed pt to have an EF of 35% in march 2018. Pt previously had an EF of 50-55% in 2016.  We continued the sotalol and also initiated metoprolol in the setting of your decreased EF upon EP recommendations   In setting of new LBBB and echo findings showing drop in EF plan was made to cath the patient however pt refused to proceed with cath at [facility] and was discharged with instructions to follow up at [facility] with her cardiologist for cath, Dr Nuha Sterling,. Spoke to Dr Sterling who was aware of patients discharge with plan to proceed with further treatment at [facility].

## 2018-06-21 NOTE — DISCHARGE NOTE ADULT - CARE PLAN
Principal Discharge DX:	Shortness of breath  Goal:	To achieve resolution of your symptoms  Assessment and plan of treatment:	You presented with sob likely attributed to pain and medication non compliance causing you to develop pulm edema. We treated you with iv diuresis and you improved. Please f/u with your cardiologist as instructed at [facility]-Dr Nuha Sterling  Secondary Diagnosis:	LBBB (left bundle branch block)  Assessment and plan of treatment:	You presented with sob , were found to have a LBBB and also have elevated troponin which peaked.  (0.04--> 0.05---> 0.03). Your echo showed a decreased EF to 25% which was concerning and plan for cath was made however you chose to proceed with further treatment and [facility]. Please f/u with your cardiologist at [facility] for further care as instructed  Secondary Diagnosis:	HFrEF (heart failure with reduced ejection fraction)  Assessment and plan of treatment:	You have a PMH of HFREF. Your last echo done By Dr Yost in [place] showed an EF of 35%, echo done on this admission revealed an EF of 25%. The drop in EF and new LBBB was concerning and plan for cath was made however you chose to proceed with further treatment and [facility]. Please f/u with your cardiologist at [facility] for further care as instructed  Secondary Diagnosis:	HTN (hypertension)  Assessment and plan of treatment:	You have a PMH of HTN , please continue your medications as instructed  Secondary Diagnosis:	Pacemaker  Assessment and plan of treatment:	You have a Pacemaker implanted in 2015, device was interrogated by EP here. Please continue follow up with your cardiologist  Secondary Diagnosis:	Atrial fibrillation  Assessment and plan of treatment:	You have a PMH of afib, please continue with Pradaxa and metoprolol succinate in setting of HFREF. You were previously on sotalol which we discontinued  Secondary Diagnosis:	CAD (coronary artery disease)  Assessment and plan of treatment:	You have a PMH of cad, please continue your meds and f/u with your cardiologist at [facility] Principal Discharge DX:	Shortness of breath  Goal:	To achieve resolution of your symptoms  Assessment and plan of treatment:	You presented with sob likely attributed to pain and medication non compliance causing you to develop pulm edema. We treated you with iv diuresis and you improved. Please f/u with your cardiologist as instructed at [facility]-Dr Nuha Sterling  Secondary Diagnosis:	LBBB (left bundle branch block)  Assessment and plan of treatment:	You presented with sob , were found to have a LBBB and also have elevated troponin which peaked.  (0.04--> 0.05---> 0.03). Your echo showed a decreased EF to 25% which was concerning and plan for cath was made however you chose to proceed with further treatment and [facility]. Please f/u with your cardiologist at [facility] for further care as instructed  Secondary Diagnosis:	HFrEF (heart failure with reduced ejection fraction)  Assessment and plan of treatment:	You have a PMH of HFREF. Your last echo done By Dr Yost in [place] showed an EF of 35%, echo done on this admission revealed an EF of 25%. The drop in EF and new LBBB was concerning and plan for cath was made however you chose to proceed with further treatment and [facility]. Please f/u with your cardiologist at [facility] for further care as instructed  Secondary Diagnosis:	HTN (hypertension)  Assessment and plan of treatment:	You have a PMH of HTN , please continue your medications as instructed  Secondary Diagnosis:	Pacemaker  Assessment and plan of treatment:	You have a Pacemaker implanted in 2015, device was interrogated by EP here. Please continue follow up with your cardiologist  Secondary Diagnosis:	Atrial fibrillation  Assessment and plan of treatment:	You have a PMH of afib, please continue with Pradaxa , sotalol and f/u with your cardiologist outpatient.  Secondary Diagnosis:	CAD (coronary artery disease)  Assessment and plan of treatment:	You have a PMH of cad, please continue your meds and f/u with your cardiologist at [facility]. We also started your on metoprolol succinate in setting of decreased EF. Please f/u with your cardiologist

## 2018-06-21 NOTE — DISCHARGE NOTE ADULT - VISION (WITH CORRECTIVE LENSES IF THE PATIENT USUALLY WEARS THEM):
wears glasses/Partially impaired: cannot see medication labels or newsprint, but can see obstacles in path, and the surrounding layout; can count fingers at arm's length [address] [place] [id number]

## 2018-06-21 NOTE — DISCHARGE NOTE ADULT - MEDICATION SUMMARY - MEDICATIONS TO TAKE
I will START or STAY ON the medications listed below when I get home from the hospital:    aspirin 81 mg oral tablet  -- 1 tab(s) by mouth once a day  -- Indication: For CAD (coronary artery disease)    ramipril 2.5 mg oral tablet  -- 1 tab(s) by mouth once a day  -- Indication: For CAD (coronary artery disease)    Pradaxa 150 mg oral capsule  -- 1 cap(s) by mouth 2 times a day  -- Indication: For Atrial fibrillation    atorvastatin 80 mg oral tablet  -- 1 tab(s) by mouth once a day (at bedtime)  -- Indication: For CAD (coronary artery disease)    Ambien 10 mg oral tablet  -- 1 tab(s) by mouth once a day (at bedtime)  -- Indication: For Depression    metoprolol succinate 25 mg oral tablet, extended release  -- 1 tab(s) by mouth once a day  -- Indication: For CONGESTIVE HEART FAILURE    Advair Diskus 250 mcg-50 mcg inhalation powder  -- 1 puff(s) inhaled 2 times a day  -- Indication: For COPD (chronic obstructive pulmonary disease)    Ventolin HFA 90 mcg/inh inhalation aerosol  -- 2 puff(s) inhaled 4 times a day  -- Indication: For CONGESTIVE HEART FAILURE    Lasix 20 mg oral tablet  -- 1 tab(s) by mouth once a day  -- Indication: For CONGESTIVE HEART FAILURE    montelukast 10 mg oral tablet  -- 1 tab(s) by mouth once a day  -- Indication: For COPD (chronic obstructive pulmonary disease)    buPROPion 300 mg/24 hours (XL) oral tablet, extended release  -- 1 tab(s) by mouth every 24 hours  -- Indication: For Need for prophylactic measure    Synthroid 75 mcg (0.075 mg) oral tablet  -- 1 tab(s) by mouth once a day  -- Indication: For Hypothyroidism I will START or STAY ON the medications listed below when I get home from the hospital:    aspirin 81 mg oral tablet  -- 1 tab(s) by mouth once a day  -- Indication: For CAD (coronary artery disease)    ramipril 2.5 mg oral tablet  -- 1 tab(s) by mouth once a day  -- Indication: For CAD (coronary artery disease)    sotalol 80 mg oral tablet  -- 1 tab(s) by mouth 2 times a day  -- Indication: For Atrial fibrillation    Pradaxa 150 mg oral capsule  -- 1 cap(s) by mouth 2 times a day  -- Indication: For Atrial fibrillation    atorvastatin 80 mg oral tablet  -- 1 tab(s) by mouth once a day (at bedtime)  -- Indication: For CAD (coronary artery disease)    Ambien 10 mg oral tablet  -- 1 tab(s) by mouth once a day (at bedtime)  -- Indication: For Depression    metoprolol succinate 25 mg oral tablet, extended release  -- 1 tab(s) by mouth once a day starting 6/22  -- Indication: For CAD (coronary artery disease)    Advair Diskus 250 mcg-50 mcg inhalation powder  -- 1 puff(s) inhaled 2 times a day  -- Indication: For COPD (chronic obstructive pulmonary disease)    Ventolin HFA 90 mcg/inh inhalation aerosol  -- 2 puff(s) inhaled 4 times a day  -- Indication: For CONGESTIVE HEART FAILURE    Lasix 20 mg oral tablet  -- 1 tab(s) by mouth once a day  -- Indication: For CHF (congestive heart failure)    montelukast 10 mg oral tablet  -- 1 tab(s) by mouth once a day  -- Indication: For COPD (chronic obstructive pulmonary disease)    buPROPion 300 mg/24 hours (XL) oral tablet, extended release  -- 1 tab(s) by mouth every 24 hours  -- Indication: For Need for prophylactic measure    Synthroid 75 mcg (0.075 mg) oral tablet  -- 1 tab(s) by mouth once a day  -- Indication: For Hypothyroidism

## 2018-06-25 DIAGNOSIS — J81.0 ACUTE PULMONARY EDEMA: ICD-10-CM
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
## 2018-06-25 DIAGNOSIS — E03.9 HYPOTHYROIDISM, UNSPECIFIED: ICD-10-CM

## 2018-06-25 DIAGNOSIS — Z91.14 PATIENT'S OTHER NONCOMPLIANCE WITH MEDICATION REGIMEN: ICD-10-CM

## 2018-06-25 DIAGNOSIS — I16.1 HYPERTENSIVE EMERGENCY: ICD-10-CM

## 2018-06-25 DIAGNOSIS — Z87.891 PERSONAL HISTORY OF NICOTINE DEPENDENCE: ICD-10-CM

## 2018-06-25 DIAGNOSIS — I25.10 ATHEROSCLEROTIC HEART DISEASE OF NATIVE CORONARY ARTERY WITHOUT ANGINA PECTORIS: ICD-10-CM

## 2018-06-25 DIAGNOSIS — Z95.5 PRESENCE OF CORONARY ANGIOPLASTY IMPLANT AND GRAFT: ICD-10-CM

## 2018-06-25 DIAGNOSIS — I44.7 LEFT BUNDLE-BRANCH BLOCK, UNSPECIFIED: ICD-10-CM

## 2018-06-25 DIAGNOSIS — J96.91 RESPIRATORY FAILURE, UNSPECIFIED WITH HYPOXIA: ICD-10-CM

## 2018-06-25 DIAGNOSIS — I11.0 HYPERTENSIVE HEART DISEASE WITH HEART FAILURE: ICD-10-CM
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
## 2018-06-25 DIAGNOSIS — Z53.29 PROCEDURE AND TREATMENT NOT CARRIED OUT BECAUSE OF PATIENT'S DECISION FOR OTHER REASONS: ICD-10-CM

## 2018-06-25 DIAGNOSIS — R06.02 SHORTNESS OF BREATH: ICD-10-CM

## 2018-06-25 DIAGNOSIS — I50.23 ACUTE ON CHRONIC SYSTOLIC (CONGESTIVE) HEART FAILURE: ICD-10-CM

## 2018-06-25 DIAGNOSIS — J44.9 CHRONIC OBSTRUCTIVE PULMONARY DISEASE, UNSPECIFIED: ICD-10-CM

## 2018-06-25 DIAGNOSIS — I48.0 PAROXYSMAL ATRIAL FIBRILLATION: ICD-10-CM

## 2018-06-25 DIAGNOSIS — Z95.0 PRESENCE OF CARDIAC PACEMAKER: ICD-10-CM

## 2018-06-25 DIAGNOSIS — Z82.49 FAMILY HISTORY OF ISCHEMIC HEART DISEASE AND OTHER DISEASES OF THE CIRCULATORY SYSTEM: ICD-10-CM

## 2018-06-25 DIAGNOSIS — Z79.01 LONG TERM (CURRENT) USE OF ANTICOAGULANTS: ICD-10-CM

## 2018-06-25 DIAGNOSIS — N17.9 ACUTE KIDNEY FAILURE, UNSPECIFIED: ICD-10-CM

## 2018-06-25 DIAGNOSIS — F32.9 MAJOR DEPRESSIVE DISORDER, SINGLE EPISODE, UNSPECIFIED: ICD-10-CM

## 2018-06-25 DIAGNOSIS — Z85.3 PERSONAL HISTORY OF MALIGNANT NEOPLASM OF BREAST: ICD-10-CM

## 2019-10-30 NOTE — DISCHARGE NOTE ADULT - PROVIDER TOKENS
Deborah [phone]      
FREE:[LAST:[Hallie],FIRST:[Nuha],PHONE:[phone]:[(   )    -]],FREE:[LAST:[Priyank],FIRST:[Adi],PHONE:[(   )    -],FAX:[(   )    -]]